# Patient Record
Sex: FEMALE | Race: WHITE | NOT HISPANIC OR LATINO | Employment: FULL TIME | URBAN - METROPOLITAN AREA
[De-identification: names, ages, dates, MRNs, and addresses within clinical notes are randomized per-mention and may not be internally consistent; named-entity substitution may affect disease eponyms.]

---

## 2017-10-24 ENCOUNTER — TRANSCRIBE ORDERS (OUTPATIENT)
Dept: ADMINISTRATIVE | Facility: HOSPITAL | Age: 22
End: 2017-10-24

## 2017-10-24 DIAGNOSIS — G56.23 LESION OF ULNAR NERVE, BILATERAL: Primary | ICD-10-CM

## 2017-10-24 DIAGNOSIS — R20.2 PARESTHESIA: ICD-10-CM

## 2018-01-25 ENCOUNTER — OFFICE VISIT (OUTPATIENT)
Dept: FAMILY MEDICINE CLINIC | Facility: CLINIC | Age: 23
End: 2018-01-25

## 2018-01-25 VITALS
DIASTOLIC BLOOD PRESSURE: 70 MMHG | BODY MASS INDEX: 21.52 KG/M2 | RESPIRATION RATE: 16 BRPM | TEMPERATURE: 97.8 F | HEART RATE: 68 BPM | WEIGHT: 114 LBS | SYSTOLIC BLOOD PRESSURE: 110 MMHG | HEIGHT: 61 IN

## 2018-01-25 DIAGNOSIS — K59.1 FUNCTIONAL DIARRHEA: Primary | ICD-10-CM

## 2018-01-25 DIAGNOSIS — R10.9 ABDOMINAL CRAMPING: ICD-10-CM

## 2018-01-25 DIAGNOSIS — R53.83 OTHER FATIGUE: ICD-10-CM

## 2018-01-25 DIAGNOSIS — E55.9 VITAMIN D DEFICIENCY: ICD-10-CM

## 2018-01-25 LAB
25(OH)D3+25(OH)D2 SERPL-MCNC: 14.8 NG/ML
ALBUMIN SERPL-MCNC: 4.3 G/DL (ref 3.2–4.8)
ALBUMIN/GLOB SERPL: 1.9 G/DL (ref 1.5–2.5)
ALP SERPL-CCNC: 54 U/L (ref 25–100)
ALT SERPL-CCNC: 11 U/L (ref 7–40)
AMYLASE SERPL-CCNC: 50 U/L (ref 30–118)
AST SERPL-CCNC: 20 U/L (ref 0–33)
BASOPHILS # BLD AUTO: 0.02 10*3/MM3 (ref 0–0.2)
BASOPHILS NFR BLD AUTO: 0.3 % (ref 0–1)
BILIRUB SERPL-MCNC: 0.9 MG/DL (ref 0.3–1.2)
BUN SERPL-MCNC: 7 MG/DL (ref 9–23)
BUN/CREAT SERPL: 11.7 (ref 7–25)
CALCIUM SERPL-MCNC: 9.5 MG/DL (ref 8.7–10.4)
CHLORIDE SERPL-SCNC: 104 MMOL/L (ref 99–109)
CO2 SERPL-SCNC: 27 MMOL/L (ref 20–31)
CREAT SERPL-MCNC: 0.6 MG/DL (ref 0.6–1.3)
EOSINOPHIL # BLD AUTO: 0.05 10*3/MM3 (ref 0–0.3)
EOSINOPHIL NFR BLD AUTO: 0.7 % (ref 0–3)
ERYTHROCYTE [DISTWIDTH] IN BLOOD BY AUTOMATED COUNT: 14.5 % (ref 11.3–14.5)
GFR SERPLBLD CREATININE-BSD FMLA CKD-EPI: 125 ML/MIN/1.73
GFR SERPLBLD CREATININE-BSD FMLA CKD-EPI: >150 ML/MIN/1.73
GLOBULIN SER CALC-MCNC: 2.3 GM/DL
GLUCOSE SERPL-MCNC: 90 MG/DL (ref 70–100)
HCT VFR BLD AUTO: 38.4 % (ref 34.5–44)
HGB BLD-MCNC: 12.5 G/DL (ref 11.5–15.5)
IMM GRANULOCYTES # BLD: 0.01 10*3/MM3 (ref 0–0.03)
IMM GRANULOCYTES NFR BLD: 0.1 % (ref 0–0.6)
LIPASE SERPL-CCNC: 32 U/L (ref 6–51)
LYMPHOCYTES # BLD AUTO: 2.18 10*3/MM3 (ref 0.6–4.8)
LYMPHOCYTES NFR BLD AUTO: 30.7 % (ref 24–44)
MAGNESIUM SERPL-MCNC: 2 MG/DL (ref 1.3–2.7)
MCH RBC QN AUTO: 29.8 PG (ref 27–31)
MCHC RBC AUTO-ENTMCNC: 32.6 G/DL (ref 32–36)
MCV RBC AUTO: 91.4 FL (ref 80–99)
MONOCYTES # BLD AUTO: 0.6 10*3/MM3 (ref 0–1)
MONOCYTES NFR BLD AUTO: 8.5 % (ref 0–12)
NEUTROPHILS # BLD AUTO: 4.24 10*3/MM3 (ref 1.5–8.3)
NEUTROPHILS NFR BLD AUTO: 59.7 % (ref 41–71)
PLATELET # BLD AUTO: 261 10*3/MM3 (ref 150–450)
POTASSIUM SERPL-SCNC: 4 MMOL/L (ref 3.5–5.5)
PROT SERPL-MCNC: 6.6 G/DL (ref 5.7–8.2)
RBC # BLD AUTO: 4.2 10*6/MM3 (ref 3.89–5.14)
SODIUM SERPL-SCNC: 136 MMOL/L (ref 132–146)
TSH SERPL DL<=0.005 MIU/L-ACNC: 1.84 MIU/ML (ref 0.35–5.35)
VIT B12 SERPL-MCNC: 333 PG/ML (ref 211–911)
WBC # BLD AUTO: 7.1 10*3/MM3 (ref 3.5–10.8)

## 2018-01-25 PROCEDURE — 99204 OFFICE O/P NEW MOD 45 MIN: CPT | Performed by: NURSE PRACTITIONER

## 2018-01-25 RX ORDER — CHOLESTYRAMINE 4 G/9G
1 POWDER, FOR SUSPENSION ORAL DAILY
Qty: 30 PACKET | Refills: 1 | Status: SHIPPED | OUTPATIENT
Start: 2018-01-25 | End: 2018-05-03

## 2018-01-25 NOTE — PROGRESS NOTES
Manjit Lau is a 22 y.o. female.     History of Present Illness   NP to establish care, moved here from [facility] to [place]  Works at [employer]    Having problem with diarrhea for the past year and abdominal pain/cramping  She has watery diarrhea after eating anything and after drinking most everything. She has tried to alter what she is eating but it does not matter. She cannot go out or she goes without eating all day so she does not have a sudden urge to defecate. Stool is watery. Her abdomen cramps and hurts in the center. She feels like she is going to pass out at times the pain is so bad. She breaks out into a cold sweat. She has tried ever OTC med to help also and nothing helps. She has not been evaluated for this problem. She says she did mention it to her previous PCP but they thought it was related to her having her gallbladder removed. She had cholecystectomy in 2014 but her watery stools did not start until 2017.  No travel out of the country prior to diarrhea, does not eat sushi or raw fish. No blood in stool, but sometimes has blood on toilet tissue. She has a good appetite. She feels fatigued all the time.   She did have a colonoscopy in 2014 that was negative.  No belching or burping. No fever or chills. She denies weight loss.         New pt health history forms completed and reviewed with pt    The following portions of the patient's history were reviewed and updated as appropriate: allergies, current medications, past family history, past medical history, past social history, past surgical history and problem list.    Review of Systems   Constitutional: Positive for fatigue. Negative for chills and fever.   HENT: Negative.    Eyes: Negative.    Respiratory: Negative.    Cardiovascular: Negative.  Negative for palpitations.   Gastrointestinal: Positive for abdominal distention, abdominal pain and diarrhea. Negative for blood in stool, constipation, nausea and  vomiting.   Endocrine: Negative.    Genitourinary: Negative.    Musculoskeletal: Negative.    Skin: Negative.    Allergic/Immunologic: Negative.    Neurological: Negative.  Negative for dizziness, light-headedness and headaches.   Hematological: Negative.    Psychiatric/Behavioral: Negative.  Negative for sleep disturbance.       Objective   Physical Exam   Constitutional: She is oriented to person, place, and time. She appears well-developed and well-nourished. No distress.   HENT:   Head: Normocephalic.   Right Ear: External ear normal.   Left Ear: External ear normal.   Nose: Nose normal.   Mouth/Throat: Oropharynx is clear and moist.   Eyes: Conjunctivae are normal. Pupils are equal, round, and reactive to light.   Neck: Normal range of motion. Neck supple. No JVD present. No thyromegaly present.   Cardiovascular: Normal rate, regular rhythm, S1 normal, S2 normal and normal heart sounds.    No murmur heard.  Pulmonary/Chest: Effort normal and breath sounds normal. No respiratory distress.   Abdominal: Soft. Normal appearance and bowel sounds are normal. She exhibits no distension. There is no hepatosplenomegaly. There is tenderness in the epigastric area. There is no rigidity, no rebound, no guarding and no CVA tenderness.   Musculoskeletal: Normal range of motion. She exhibits no edema.   Lymphadenopathy:     She has no cervical adenopathy.   Neurological: She is alert and oriented to person, place, and time. She has normal reflexes.   Skin: Skin is warm and dry.   Psychiatric: She has a normal mood and affect. Her behavior is normal. Judgment and thought content normal.   Nursing note and vitals reviewed.      Assessment/Plan   Holli was seen today for np / establish pcp and diarrhea.    Diagnoses and all orders for this visit:    Functional diarrhea  -     TSH  -     CBC & Differential  -     Ambulatory Referral to Gastroenterology  -     cholestyramine (QUESTRAN) 4 g packet; Take 1 packet by mouth  Daily.    Abdominal cramping  -     CBC & Differential  -     Comprehensive Metabolic Panel  -     Magnesium  -     Amylase  -     Lipase  -     Ambulatory Referral to Gastroenterology    Other fatigue  -     Vitamin B12    Vitamin D deficiency  -     Vitamin D 25 Hydroxy        Will check labs and notify pt of results  Will start pt on Questran to see if this will help with diarrhea and see pt back in 2 weeks or sooner if needed.  Discussed collecting stool specimens.  Will consider Bentyl at follow up visit.  Will refer to GI specialist to evaluate diarrhea as I think it is unusual for someone to have diarrhea related to gallbladder removal 3 years after the fact.

## 2018-02-01 ENCOUNTER — OFFICE VISIT (OUTPATIENT)
Dept: GASTROENTEROLOGY | Facility: CLINIC | Age: 23
End: 2018-02-01

## 2018-02-01 VITALS
SYSTOLIC BLOOD PRESSURE: 102 MMHG | OXYGEN SATURATION: 99 % | HEART RATE: 84 BPM | BODY MASS INDEX: 21.56 KG/M2 | HEIGHT: 61 IN | RESPIRATION RATE: 14 BRPM | TEMPERATURE: 98.5 F | DIASTOLIC BLOOD PRESSURE: 70 MMHG | WEIGHT: 114.2 LBS

## 2018-02-01 DIAGNOSIS — Z79.2 LONG TERM (CURRENT) USE OF ANTIBIOTICS: ICD-10-CM

## 2018-02-01 DIAGNOSIS — K58.0 IRRITABLE BOWEL SYNDROME WITH DIARRHEA: Primary | ICD-10-CM

## 2018-02-01 PROCEDURE — 99214 OFFICE O/P EST MOD 30 MIN: CPT | Performed by: NURSE PRACTITIONER

## 2018-02-01 NOTE — PATIENT INSTRUCTIONS
"Start Over The Counter (OTC) probiotic (Culturelle, Chava's Colon, Health, Align or any probiotics that contain \"Lactobacillus\" or \"Bifidobacterium\") once daily.  Diet for Irritable Bowel Syndrome  Introduction  When you have irritable bowel syndrome (IBS), the foods you eat and your eating habits are very important. IBS may cause various symptoms, such as abdominal pain, constipation, or diarrhea. Choosing the right foods can help ease discomfort caused by these symptoms. Work with your health care provider and dietitian to find the best eating plan to help control your symptoms.  What general guidelines do I need to follow?  · Keep a food diary. This will help you identify foods that cause symptoms. Write down:  ¨ What you eat and when.  ¨ What symptoms you have.  ¨ When symptoms occur in relation to your meals.  · Avoid foods that cause symptoms. Talk with your dietitian about other ways to get the same nutrients that are in these foods.  · Eat more foods that contain fiber. Take a fiber supplement if directed by your dietitian.  · Eat your meals slowly, in a relaxed setting.  · Aim to eat 5-6 small meals per day. Do not skip meals.  · Drink enough fluids to keep your urine clear or pale yellow.  · Ask your health care provider if you should take an over-the-counter probiotic during flare-ups to help restore healthy gut bacteria.  · If you have cramping or diarrhea, try making your meals low in fat and high in carbohydrates. Examples of carbohydrates are pasta, rice, whole grain breads and cereals, fruits, and vegetables.  · If dairy products cause your symptoms to flare up, try eating less of them. You might be able to handle yogurt better than other dairy products because it contains bacteria that help with digestion.  What foods are not recommended?  The following are some foods and drinks that may worsen your symptoms:  · Fatty foods, such as French fries.  · Milk products, such as cheese or ice " cream.  · Chocolate.  · Alcohol.  · Products with caffeine, such as coffee.  · Carbonated drinks, such as soda.  The items listed above may not be a complete list of foods and beverages to avoid. Contact your dietitian for more information.   What foods are good sources of fiber?  Your health care provider or dietitian may recommend that you eat more foods that contain fiber. Fiber can help reduce constipation and other IBS symptoms. Add foods with fiber to your diet a little at a time so that your body can get used to them. Too much fiber at once might cause gas and swelling of your abdomen. The following are some foods that are good sources of fiber:  · Apples.  · Peaches.  · Pears.  · Berries.  · Figs.  · Broccoli (raw).  · Cabbage.  · Carrots.  · Raw peas.  · Kidney beans.  · Lima beans.  · Whole grain bread.  · Whole grain cereal.  Where to find more information:  International Foundation for Functional Gastrointestinal Disorders: www.iffgd.org  National [place] of Diabetes and Digestive and Kidney Diseases: www.niddk.nih.gov/health-information/health-topics/digestive-diseases/ibs/Pages/facts.aspx  This information is not intended to replace advice given to you by your health care provider. Make sure you discuss any questions you have with your health care provider.  Document Released: 03/09/2005 Document Revised: 05/25/2017 Document Reviewed: 03/20/2015  © 2017 Lisa  Irritable Bowel Syndrome, Adult  Irritable bowel syndrome (IBS) is not one specific disease. It is a group of symptoms that affects the organs responsible for digestion (gastrointestinal or GI tract).  To regulate how your GI tract works, your body sends signals back and forth between your intestines and your brain. If you have IBS, there may be a problem with these signals. As a result, your GI tract does not function normally. Your intestines may become more sensitive and overreact to certain things. This is especially true when you eat  certain foods or when you are under stress.  There are four types of IBS. These may be determined based on the consistency of your stool:  · IBS with diarrhea.  · IBS with constipation.  · Mixed IBS.  · Unsubtyped IBS.  It is important to know which type of IBS you have. Some treatments are more likely to be helpful for certain types of IBS.  What are the causes?  The exact cause of IBS is not known.  What increases the risk?  You may have a higher risk of IBS if:  · You are a woman.  · You are younger than 45 years old.  · You have a family history of IBS.  · You have mental health problems.  · You have had bacterial infection of your GI tract.  What are the signs or symptoms?  Symptoms of IBS vary from person to person. The main symptom is abdominal pain or discomfort. Additional symptoms usually include one or more of the following:  · Diarrhea, constipation, or both.  · Abdominal swelling or bloating.  · Feeling full or sick after eating a small or regular-size meal.  · Frequent gas.  · Mucus in the stool.  · A feeling of having more stool left after a bowel movement.  Symptoms tend to come and go. They may be associated with stress, psychiatric conditions, or nothing at all.  How is this diagnosed?  There is no specific test to diagnose IBS. Your health care provider will make a diagnosis based on a physical exam, medical history, and your symptoms. You may have other tests to rule out other conditions that may be causing your symptoms. These may include:  · Blood tests.  · X-rays.  · CT scan.  · Endoscopy and colonoscopy. This is a test in which your GI tract is viewed with a long, thin, flexible tube.  How is this treated?  There is no cure for IBS, but treatment can help relieve symptoms. IBS treatment often includes:  · Changes to your diet, such as:  ¨ Eating more fiber.  ¨ Avoiding foods that cause symptoms.  ¨ Drinking more water.  ¨ Eating regular, medium-sized portioned meals.  · Medicines. These may  include:  ¨ Fiber supplements if you have constipation.  ¨ Medicine to control diarrhea (antidiarrheal medicines).  ¨ Medicine to help control muscle spasms in your GI tract (antispasmodic medicines).  ¨ Medicines to help with any mental health issues, such as antidepressants or tranquilizers.  · Therapy.  ¨ Talk therapy may help with anxiety, depression, or other mental health issues that can make IBS symptoms worse.  · Stress reduction.  ¨ Managing your stress can help keep symptoms under control.  Follow these instructions at home:  · Take medicines only as directed by your health care provider.  · Eat a healthy diet.  ¨ Avoid foods and drinks with added sugar.  ¨ Include more whole grains, fruits, and vegetables gradually into your diet. This may be especially helpful if you have IBS with constipation.  ¨ Avoid any foods and drinks that make your symptoms worse. These may include dairy products and caffeinated or carbonated drinks.  ¨ Do not eat large meals.  ¨ Drink enough fluid to keep your urine clear or pale yellow.  · Exercise regularly. Ask your health care provider for recommendations of good activities for you.  · Keep all follow-up visits as directed by your health care provider. This is important.  Contact a health care provider if:  · You have constant pain.  · You have trouble or pain with swallowing.  · You have worsening diarrhea.  Get help right away if:  · You have severe and worsening abdominal pain.  · You have diarrhea and:  ¨ You have a rash, stiff neck, or severe headache.  ¨ You are irritable, sleepy, or difficult to awaken.  ¨ You are weak, dizzy, or extremely thirsty.  · You have bright red blood in your stool or you have black tarry stools.  · You have unusual abdominal swelling that is painful.  · You vomit continuously.  · You vomit blood (hematemesis).  · You have both abdominal pain and a fever.  This information is not intended to replace advice given to you by your health care  provider. Make sure you discuss any questions you have with your health care provider.  Document Released: 12/18/2006 Document Revised: 05/19/2017 Document Reviewed: 09/04/2015  Elsevier Interactive Patient Education © 2017 Elsevier Inc.

## 2018-02-01 NOTE — PROGRESS NOTES
"    GASTROENTEROLOGY OUTPATIENT NEW PATIENT NOTE  Patient: JORGE BROWN : 1995  Date of Service: 2018  Dear Dr.Amy JAMSHID Landa, ANYA   Thank you for your referral of this patient for evaluation of diarrhea  CC: Diarrhea and Nausea  Assessment/Plan                                             ASSESSMENT & PLANS     Chronic diarrhea r/t infectious diarrhea vs Irritable bowel syndrome with diarrhea   Long term (recent) use of antibiotics  -     Clostridium Difficile Toxin, PCR - Stool, Per Rectum; Future  · Start Over The Counter (OTC) probiotic (Culturelle, Chava's Colon, Health, Align or any probiotics that contain \"Lactobacillus\" or \"Bifidobacterium\") once daily.    Follow Up:Return in about 3 months (around 2018) for Recheck.      DISCUSSION: The above plan was delineated in details with patient and all questions and concerns were answered.  Patient is also given contact information.  Patient is to return as scheduled or sooner if new problems arise  Subjective                                                     SUBJECTIVE   History of Present Illness  Ms. Jorge Brown is a 22 y.o. female presents to the clinic today for an evaluation of Diarrhea and Nausea  Having problem with diarrhea for the past year and abdominal pain/cramping  She has watery diarrhea after eating or drinking almost anything, even bland food. No specific food is a trigger. Cucumber, chiken, pizza, pretzel are the worst   Diarrhea is mostly day time. About 5 times a day. Mostly started eating. Sometimes does not finish the meal w/o stopping to go to the bathroom  She cannot go out or she goes without eating all day so she does not have a sudden urge to defecate.   Stool is watery.  Pt denies dark black stools or bright red blood in the stools, in the toilet bowl, or on the toilet tissue.  Her abdomen cramps and hurts in the center. She feels like she is going to pass out at times the pain is so bad. She breaks out into a cold " sweat, feeling like hot flashes .  No fever or chills. She denies weight loss    She has tried to alter what she is eating but it does not matter.   She has tried ever OTC med to help also and nothing helps.   Tried Cholestyramine, but it made her sick b/c it taste chalky.    She had cholecystectomy in 2014 but her watery stools did not start until 2017. No NSAIDS. Frequent abx for UTIs about 7-8 times last year. Also saw a urologist and had a cystoscopy   No travel out of the country prior to diarrhea, does not eat sushi or raw fish. No blood in stool, but sometimes has blood on toilet tissue. She has a good appetite. She feels fatigued all the time.   She did have a colonoscopy in 2012 (done by Ge Bhatti, pediatrician, [facility]) that was negative.    ROS:Review of Systems   Constitutional: Negative.  Negative for fever.        Pt currently or recently takes NSAIDS ( (i.e Ibuprofen, Aleve, Advil, Exedrin, BC Powder, diclofenac, meloxicam, & Naproxen, etc)?  No    Pt currently or recently takes abx   No   HENT: Negative.    Eyes: Negative.    Respiratory: Negative.    Cardiovascular: Negative.    Gastrointestinal: Positive for abdominal distention, abdominal pain, diarrhea and nausea. Negative for constipation.   Endocrine: Negative.    Genitourinary: Negative.  Negative for dysuria, frequency and hematuria.   Musculoskeletal: Negative.  Negative for arthralgias and myalgias.   Skin: Negative.    Allergic/Immunologic: Negative.    Neurological: Negative.  Negative for headaches.   Hematological: Negative.    Psychiatric/Behavioral: Negative.      Subjective   PAST MED HX: Pt  has no past medical history on file.  PAST SURG HX: Pt  has a past surgical history that includes tonsillectomy and adenoidectomy (approx 2001); Umbilical hernia repair (11/2016); [place] tooth extraction (03/2012); and Cholecystectomy (01/2014).  FAM HX: family history includes Cancer in her mother; No Known Problems  in her father.  SOC HX: Pt  reports that she has never smoked. She has never used smokeless tobacco. She reports that she does not drink alcohol or use illicit drugs. I glass a wk at most   Objective                                                           OBJECTIVE   Allergy: Pt has No Known Allergies.  MEDS: •  cholestyramine (QUESTRAN) 4 g packet, Take 1 packet by mouth Daily., Disp: 30 packet, Rfl: 1  •  Levonorgestrel 13.5 MG intrauterine device IUD, 1 each by Intrauterine route 1 (One) Time., Disp: , Rfl:   Lab Results   Component Value Date    WBC 7.10 01/25/2018    HGB 12.5 01/25/2018    HCT 38.4 01/25/2018    MCV 91.4 01/25/2018    MCHC 32.6 01/25/2018     01/25/2018     Lab Results   Component Value Date     01/25/2018    K 4.0 01/25/2018     01/25/2018    CO2 27.0 01/25/2018    BUN 7 (L) 01/25/2018    CREATININE 0.60 01/25/2018    CALCIUM 9.5 01/25/2018     Lab Results   Component Value Date    AST 20 01/25/2018    ALT 11 01/25/2018    ALKPHOS 54 01/25/2018    BILITOT 0.9 01/25/2018    ALBUMIN 4.30 01/25/2018     Lab Results   Component Value Date    LIPASE 32 01/25/2018     No results found for: HGBA1C  No results found for: HEPAIGM, HEPBSAG, HEPBIGMCORE, HEPCAB, HEPCVIRUSABY  Wt Readings from Last 5 Encounters:   02/01/18 51.8 kg (114 lb 3.2 oz)   01/25/18 51.7 kg (114 lb)   body mass index is 21.58 kg/(m^2).,Temp: 98.5 °F (36.9 °C),BP: 102/70,Heart Rate: 84   Physical Exam  General Well developed; well nourished; no acute distress.   ENT Oral mucosa pink and moist without thrush or lesions.    Neck Neck supple; trachea midline. No thyromegaly   Resp CTA; no rhonchi, rales, or wheezes.  Respiration effort normal  CV RRR; normal S1, S2; no M/R/G. No lower extremity edema  GI Abd soft, NT, ND, normal active bowel sounds.  No HSM.  No abd hernia  Skin No rash; no lesions; no bruises.  Skin turgor normal  Musc No clubbing; no cyanosis.  Gait steady  Psych Oriented to time, place, and  person.  Appropriate affect  Thank you kindly for allowing me to be part of this patient’s care.    Pt care team: Vilma JOYNER & Mary Chong MA  [facility]--Gastroenterology  [phone]    CC: Dr.Amy JAMSHID Landa, ANYA  [address] [facility] [address] / [place] [address] FAX:[phone]

## 2018-02-08 ENCOUNTER — OFFICE VISIT (OUTPATIENT)
Dept: FAMILY MEDICINE CLINIC | Facility: CLINIC | Age: 23
End: 2018-02-08

## 2018-02-08 VITALS
DIASTOLIC BLOOD PRESSURE: 68 MMHG | HEART RATE: 80 BPM | TEMPERATURE: 98.4 F | BODY MASS INDEX: 21.45 KG/M2 | WEIGHT: 113.5 LBS | SYSTOLIC BLOOD PRESSURE: 104 MMHG | RESPIRATION RATE: 16 BRPM

## 2018-02-08 DIAGNOSIS — K52.9 CHRONIC DIARRHEA: ICD-10-CM

## 2018-02-08 DIAGNOSIS — J06.9 VIRAL UPPER RESPIRATORY TRACT INFECTION: ICD-10-CM

## 2018-02-08 DIAGNOSIS — R50.81 FEVER IN OTHER DISEASES: Primary | ICD-10-CM

## 2018-02-08 LAB
EXPIRATION DATE: NORMAL
FLUAV AG NPH QL: NORMAL
FLUBV AG NPH QL: NORMAL
INTERNAL CONTROL: NORMAL
Lab: NORMAL

## 2018-02-08 PROCEDURE — 99214 OFFICE O/P EST MOD 30 MIN: CPT | Performed by: NURSE PRACTITIONER

## 2018-02-08 PROCEDURE — 87804 INFLUENZA ASSAY W/OPTIC: CPT | Performed by: NURSE PRACTITIONER

## 2018-02-08 RX ORDER — OSELTAMIVIR PHOSPHATE 75 MG/1
75 CAPSULE ORAL 2 TIMES DAILY
Qty: 10 CAPSULE | Refills: 0 | Status: SHIPPED | OUTPATIENT
Start: 2018-02-08 | End: 2018-03-07

## 2018-02-08 RX ORDER — DICYCLOMINE HYDROCHLORIDE 10 MG/1
10 CAPSULE ORAL
Qty: 120 CAPSULE | Refills: 1 | Status: SHIPPED | OUTPATIENT
Start: 2018-02-08 | End: 2018-10-29

## 2018-02-08 RX ORDER — ONDANSETRON 4 MG/1
4 TABLET, FILM COATED ORAL EVERY 8 HOURS PRN
Qty: 30 TABLET | Refills: 0 | Status: SHIPPED | OUTPATIENT
Start: 2018-02-08 | End: 2018-03-07 | Stop reason: SDUPTHER

## 2018-02-08 NOTE — PROGRESS NOTES
Subjective   Holli Lau is a 22 y.o. female.     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  Concerned that she has the flu  No cough or chest congestion    Still having diarrhea  It has improved since starting Questran  Was seen at GI doctor office and told she though she was having diarrhea due to antibiotic overuse, she was told to start probiotics OTC and check stool specimen for C diff  She also wants her to get a copy of her colonoscopy she had when she was 11 years old from [facility]  Jan 2014 cholecystectomy  No diarrhea immediately after gall bladder removed, then started having abdominal cramping and bloating and watery diarrhea with everything she eats or drinks for the past year or more. Some nausea and vomiting. Doubles over in pain with urge to have BM, then pain resolves. Does not matter what she eats or drinks.  No hx of IBS or pancreatitis        The following portions of the patient's history were reviewed and updated as appropriate: allergies, current medications, past family history, past medical history, past social history, past surgical history and problem list.    Review of Systems   Constitutional: Positive for activity change, chills, fatigue and fever. Negative for appetite change.   HENT: Positive for congestion, postnasal drip, rhinorrhea and sore throat.    Respiratory: Negative for cough, chest tightness, shortness of breath and wheezing.    Cardiovascular: Negative.    Gastrointestinal: Positive for abdominal distention, abdominal pain, diarrhea, nausea and vomiting. Negative for anal bleeding, blood in stool and constipation.   Musculoskeletal: Positive for myalgias.   Skin: Negative.  Negative for rash.   Neurological: Positive for headaches. Negative for dizziness and light-headedness.   Hematological: Negative  for adenopathy. Does not bruise/bleed easily.   Psychiatric/Behavioral: Negative for sleep disturbance.       Objective   Physical Exam   Constitutional: She is oriented to person, place, and time. She appears well-developed and well-nourished. No distress.   HENT:   Head: Normocephalic.   Right Ear: Tympanic membrane, external ear and ear canal normal.   Left Ear: Tympanic membrane, external ear and ear canal normal.   Nose: Rhinorrhea present. Right sinus exhibits no maxillary sinus tenderness and no frontal sinus tenderness. Left sinus exhibits no maxillary sinus tenderness and no frontal sinus tenderness.   Mouth/Throat: Uvula is midline, oropharynx is clear and moist and mucous membranes are normal. No oropharyngeal exudate, posterior oropharyngeal edema or posterior oropharyngeal erythema.   Eyes: Conjunctivae are normal.   Neck: Normal range of motion. Neck supple.   Cardiovascular: Normal rate, regular rhythm, S1 normal, S2 normal and normal heart sounds.    Pulmonary/Chest: Effort normal and breath sounds normal. She has no decreased breath sounds. She has no wheezes. She has no rhonchi. She has no rales.   Lymphadenopathy:        Head (right side): No tonsillar, no preauricular, no posterior auricular and no occipital adenopathy present.        Head (left side): No tonsillar, no preauricular, no posterior auricular and no occipital adenopathy present.     She has no cervical adenopathy.   Neurological: She is alert and oriented to person, place, and time.   Skin: Skin is warm, dry and intact. No rash noted.   Psychiatric: She has a normal mood and affect. Her speech is normal and behavior is normal. Judgment and thought content normal. Cognition and memory are normal.   Nursing note and vitals reviewed.      Assessment/Plan   Holli was seen today for generalized body aches, fever and nasal congestion.    Diagnoses and all orders for this visit:    Fever in other diseases  -     POCT Influenza  A/B    Viral upper respiratory tract infection    Chronic diarrhea    Other orders  -     oseltamivir (TAMIFLU) 75 MG capsule; Take 1 capsule by mouth 2 (Two) Times a Day.  -     ondansetron (ZOFRAN) 4 MG tablet; Take 1 tablet by mouth Every 8 (Eight) Hours As Needed for Nausea or Vomiting.  -     dicyclomine (BENTYL) 10 MG capsule; Take 1 capsule by mouth 4 (Four) Times a Day Before Meals & at Bedtime.      Influenza A&B negative  Pt has had exposure to flu and symptoms suggestive of flu therefore will treat with Tamiflu  Rest and fluids, off school and work while feverish or symptomatic  Will refill Zofran  Will start pt on Dicyclomine prior to meals  Continue Questran  Recommend OTC probiotics and keep follow up appt with GI  Will have pt sign consent to get copy of colonoscopy will forward to GI specialist  Take Ibuprofen for fever HA or body aches  F/U if not improving, pt agrees.

## 2018-03-07 ENCOUNTER — OFFICE VISIT (OUTPATIENT)
Dept: FAMILY MEDICINE CLINIC | Facility: CLINIC | Age: 23
End: 2018-03-07

## 2018-03-07 VITALS
DIASTOLIC BLOOD PRESSURE: 62 MMHG | TEMPERATURE: 97.8 F | WEIGHT: 114 LBS | RESPIRATION RATE: 12 BRPM | BODY MASS INDEX: 21.52 KG/M2 | SYSTOLIC BLOOD PRESSURE: 96 MMHG | HEIGHT: 61 IN | OXYGEN SATURATION: 99 % | HEART RATE: 77 BPM

## 2018-03-07 DIAGNOSIS — K59.1 FUNCTIONAL DIARRHEA: ICD-10-CM

## 2018-03-07 DIAGNOSIS — R11.0 CHRONIC NAUSEA: ICD-10-CM

## 2018-03-07 DIAGNOSIS — R59.9 LYMPH NODES ENLARGED: Primary | ICD-10-CM

## 2018-03-07 DIAGNOSIS — J30.89 ENVIRONMENTAL AND SEASONAL ALLERGIES: ICD-10-CM

## 2018-03-07 DIAGNOSIS — K30 BURNING SENSATION OF STOMACH: ICD-10-CM

## 2018-03-07 DIAGNOSIS — Z97.5 IUD (INTRAUTERINE DEVICE) IN PLACE: ICD-10-CM

## 2018-03-07 PROCEDURE — 99213 OFFICE O/P EST LOW 20 MIN: CPT | Performed by: NURSE PRACTITIONER

## 2018-03-07 RX ORDER — AMOXICILLIN 875 MG/1
875 TABLET, COATED ORAL 2 TIMES DAILY
Qty: 14 TABLET | Refills: 0 | Status: SHIPPED | OUTPATIENT
Start: 2018-03-07 | End: 2018-05-03

## 2018-03-07 RX ORDER — ONDANSETRON 4 MG/1
4 TABLET, FILM COATED ORAL EVERY 8 HOURS PRN
Qty: 30 TABLET | Refills: 1 | Status: SHIPPED | OUTPATIENT
Start: 2018-03-07 | End: 2018-10-03

## 2018-03-07 RX ORDER — DEXLANSOPRAZOLE 60 MG/1
60 CAPSULE, DELAYED RELEASE ORAL DAILY
Qty: 30 CAPSULE | Refills: 5 | Status: SHIPPED | OUTPATIENT
Start: 2018-03-07 | End: 2018-04-06

## 2018-03-07 RX ORDER — FLUTICASONE PROPIONATE 50 MCG
2 SPRAY, SUSPENSION (ML) NASAL DAILY
Qty: 1 BOTTLE | Refills: 5 | Status: SHIPPED | OUTPATIENT
Start: 2018-03-07 | End: 2018-04-06

## 2018-03-07 NOTE — PROGRESS NOTES
Subjective   Holli Lau is a 22 y.o. female.     History of Present Illness   Left side of neck with 2 swollen lymph nodes for the past several days  No fever or chills, no weight loss  Some nasal congestion, no cough, some ear fullness  Just got over the flu a week ago  + hx of allergies, used to be on allergy shots  No family hx of lymphoma or cancer      Diarrhea  Better on Questran using 1/2 tavo twice day  A lot less diarrhea. 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   She does not take NSAIDs OTC  She tried OTC Omeprazole for a few days but it did not really help  She has never had a scope of her stomach only a colonoscopy before she had her gallbladder removed     Has an IUD  Needs a referral to GYN to have it checked  She says her mother had female cancer (not sure if ovarian or cervical) in her 30s she is very good about getting her annual exams  No hx of abnormal pap    Going on a trip to [place] for a week    The following portions of the patient's history were reviewed and updated as appropriate: allergies, current medications, past family history, past medical history, past social history, past surgical history and problem list.    Review of Systems   Constitutional: Negative for activity change, appetite change, chills, diaphoresis, fatigue, fever and unexpected weight change.   HENT: Positive for congestion, postnasal drip and rhinorrhea. Negative for ear pain and sore throat.    Respiratory: Negative for cough and shortness of breath.    Cardiovascular: Negative for chest pain and palpitations.   Gastrointestinal: Positive for diarrhea, nausea and vomiting. Negative for blood in stool.    Genitourinary: Negative for vaginal bleeding, vaginal discharge and vaginal pain.   Musculoskeletal: Negative.    Neurological: Negative for dizziness, light-headedness and headaches.   Psychiatric/Behavioral: Negative for dysphoric mood and sleep disturbance. The patient is nervous/anxious.        Objective   Physical Exam   Constitutional: She appears well-developed and well-nourished. No distress.   HENT:   Head: Normocephalic and atraumatic.   Right Ear: External ear normal. Tympanic membrane is erythematous and bulging.   Left Ear: External ear normal. Tympanic membrane is erythematous and retracted.   Nose: Mucosal edema (and erythema) present. Right sinus exhibits no maxillary sinus tenderness and no frontal sinus tenderness. Left sinus exhibits no maxillary sinus tenderness and no frontal sinus tenderness.   Eyes: Conjunctivae and lids are normal.   Neck: Trachea normal and normal range of motion. Neck supple. No JVD present. No spinous process tenderness and no muscular tenderness present. No rigidity. Normal range of motion present. No thyroid mass and no thyromegaly present.   Cardiovascular: Normal rate, regular rhythm, S1 normal, S2 normal and normal heart sounds.    Pulmonary/Chest: Effort normal and breath sounds normal. No respiratory distress. She has no wheezes. She has no rales.   Abdominal: Soft. There is no tenderness.   Lymphadenopathy:     She has cervical adenopathy.        Right cervical: No superficial cervical, no deep cervical and no posterior cervical adenopathy present.       Left cervical: Posterior cervical adenopathy present. No superficial cervical and no deep cervical adenopathy present.     She has no axillary adenopathy.   Skin: Skin is warm, dry and intact.   Psychiatric: She has a normal mood and affect. Her speech is normal and behavior is normal. Judgment and thought content normal. Cognition and memory are normal.   Nursing note and vitals reviewed.      Assessment/Plan    Mercedes was seen today for lymph nodes swollen left side neck.    Diagnoses and all orders for this visit:    Lymph nodes enlarged  -     amoxicillin (AMOXIL) 875 MG tablet; Take 1 tablet by mouth 2 (Two) Times a Day.    Functional diarrhea    Burning sensation of stomach  -     dexlansoprazole (DEXILANT) 60 MG capsule; Take 1 capsule by mouth Daily for 30 days.    Environmental and seasonal allergies  -     fluticasone (FLONASE) 50 MCG/ACT nasal spray; 2 sprays into each nostril Daily for 30 days. Administer 2 sprays in each nostril for each dose.    Chronic nausea  -     ondansetron (ZOFRAN) 4 MG tablet; Take 1 tablet by mouth Every 8 (Eight) Hours As Needed for Nausea or Vomiting.    IUD (intrauterine device) in place  -     Ambulatory Referral to Gynecology      Will have pt take Amoxicillin for 7 days to see if lymph nodes in neck resolve. If after 2-3 weeks they remain will do further testing.  Will have pt take Dexilant daily for gastritis  Will have pt take Flonase NS to help with ETD and nasal congestion prior to flying on plane  Will refer pt to GYN as requested  When pt returns from trip recommend that she call so I can put in referral for EGD. Pt agrees.

## 2018-03-14 ENCOUNTER — TELEPHONE (OUTPATIENT)
Dept: FAMILY MEDICINE CLINIC | Facility: CLINIC | Age: 23
End: 2018-03-14

## 2018-03-14 DIAGNOSIS — K52.9 CHRONIC DIARRHEA OF UNKNOWN ORIGIN: ICD-10-CM

## 2018-03-14 DIAGNOSIS — R10.9 ABDOMINAL CRAMPING: ICD-10-CM

## 2018-03-14 DIAGNOSIS — K30 BURNING SENSATION OF STOMACH: Primary | ICD-10-CM

## 2018-03-14 NOTE — TELEPHONE ENCOUNTER
I have ordered the EGD and colonoscopy since pt will need to be sedated, it might be a good idea to get them both done at once if she is okay with this.  .   Also, Please ask Holli if the lymph node enlargement has improved?     Hopefully she had a great vacation! ajc

## 2018-03-14 NOTE — TELEPHONE ENCOUNTER
----- Message from Natalie Angulo sent at 3/14/2018  1:58 PM EDT -----  Contact: ERIC; PT CALLED  PT CALLED TO REPORT THAT SHE HAS RETURNED FROM VACATION  SO THE APPT FOR THE SCOPE COULD BE SET UP    [phone]  MESSAGE OK

## 2018-04-02 ENCOUNTER — OUTSIDE FACILITY SERVICE (OUTPATIENT)
Dept: GASTROENTEROLOGY | Facility: CLINIC | Age: 23
End: 2018-04-02

## 2018-04-02 ENCOUNTER — LAB REQUISITION (OUTPATIENT)
Dept: LAB | Facility: HOSPITAL | Age: 23
End: 2018-04-02

## 2018-04-02 DIAGNOSIS — R10.84 GENERALIZED ABDOMINAL PAIN: ICD-10-CM

## 2018-04-02 PROCEDURE — 43239 EGD BIOPSY SINGLE/MULTIPLE: CPT | Performed by: INTERNAL MEDICINE

## 2018-04-02 PROCEDURE — 45380 COLONOSCOPY AND BIOPSY: CPT | Performed by: INTERNAL MEDICINE

## 2018-04-02 PROCEDURE — 88305 TISSUE EXAM BY PATHOLOGIST: CPT | Performed by: INTERNAL MEDICINE

## 2018-04-03 LAB
CYTO UR: NORMAL
LAB AP CASE REPORT: NORMAL
LAB AP CLINICAL INFORMATION: NORMAL
Lab: NORMAL
PATH REPORT.FINAL DX SPEC: NORMAL
PATH REPORT.GROSS SPEC: NORMAL

## 2018-04-04 ENCOUNTER — TELEPHONE (OUTPATIENT)
Dept: GASTROENTEROLOGY | Facility: CLINIC | Age: 23
End: 2018-04-04

## 2018-04-04 NOTE — TELEPHONE ENCOUNTER
----- Message from Ilia Diaz MD sent at 4/3/2018  5:05 PM EDT -----  Please call Holli and inform her that her biopsies were all normal.  She was placed on IBgard as the most likely diagnosis is irritable bowel syndrome and I hope the peppermint oil improves her symptoms. Dr. Diaz

## 2018-05-03 ENCOUNTER — TELEPHONE (OUTPATIENT)
Dept: FAMILY MEDICINE CLINIC | Facility: CLINIC | Age: 23
End: 2018-05-03

## 2018-05-03 ENCOUNTER — OFFICE VISIT (OUTPATIENT)
Dept: FAMILY MEDICINE CLINIC | Facility: CLINIC | Age: 23
End: 2018-05-03

## 2018-05-03 VITALS
SYSTOLIC BLOOD PRESSURE: 100 MMHG | HEART RATE: 72 BPM | HEIGHT: 61 IN | WEIGHT: 112 LBS | BODY MASS INDEX: 21.14 KG/M2 | RESPIRATION RATE: 16 BRPM | DIASTOLIC BLOOD PRESSURE: 65 MMHG | TEMPERATURE: 97.5 F

## 2018-05-03 DIAGNOSIS — R31.9 HEMATURIA, UNSPECIFIED TYPE: ICD-10-CM

## 2018-05-03 DIAGNOSIS — R30.0 DYSURIA: ICD-10-CM

## 2018-05-03 DIAGNOSIS — R10.9 ACUTE RIGHT FLANK PAIN: Primary | ICD-10-CM

## 2018-05-03 DIAGNOSIS — R10.11 RUQ ABDOMINAL PAIN: ICD-10-CM

## 2018-05-03 DIAGNOSIS — R82.998 URINE LEUKOCYTES: ICD-10-CM

## 2018-05-03 LAB
BILIRUB BLD-MCNC: NEGATIVE MG/DL
CLARITY, POC: CLEAR
COLOR UR: YELLOW
GLUCOSE UR STRIP-MCNC: NEGATIVE MG/DL
KETONES UR QL: NEGATIVE
LEUKOCYTE EST, POC: ABNORMAL
NITRITE UR-MCNC: NEGATIVE MG/ML
PH UR: 5 [PH] (ref 5–8)
PROT UR STRIP-MCNC: NEGATIVE MG/DL
RBC # UR STRIP: ABNORMAL /UL
SP GR UR: 1.02 (ref 1–1.03)
UROBILINOGEN UR QL: NORMAL

## 2018-05-03 PROCEDURE — 99214 OFFICE O/P EST MOD 30 MIN: CPT | Performed by: NURSE PRACTITIONER

## 2018-05-03 RX ORDER — CIPROFLOXACIN 500 MG/1
500 TABLET, FILM COATED ORAL EVERY 12 HOURS SCHEDULED
Qty: 14 TABLET | Refills: 0 | Status: SHIPPED | OUTPATIENT
Start: 2018-05-03 | End: 2018-10-03

## 2018-05-03 RX ORDER — FLUCONAZOLE 150 MG/1
150 TABLET ORAL ONCE
Qty: 1 TABLET | Refills: 0 | Status: SHIPPED | OUTPATIENT
Start: 2018-05-03 | End: 2018-05-03

## 2018-05-03 RX ORDER — PHENAZOPYRIDINE HYDROCHLORIDE 100 MG/1
100 TABLET, FILM COATED ORAL 3 TIMES DAILY PRN
Qty: 10 TABLET | Refills: 0 | Status: SHIPPED | OUTPATIENT
Start: 2018-05-03 | End: 2018-10-03

## 2018-05-03 RX ORDER — FLUTICASONE PROPIONATE 50 MCG
2 SPRAY, SUSPENSION (ML) NASAL DAILY
COMMUNITY
End: 2019-03-06 | Stop reason: SDUPTHER

## 2018-05-03 NOTE — PATIENT INSTRUCTIONS
Pyelonephritis, Adult  Pyelonephritis is a kidney infection. The kidneys are the organs that filter a person's blood and move waste out of the bloodstream and into the urine. Urine passes from the kidneys, through the ureters, and into the bladder. There are two main types of pyelonephritis:  · Infections that come on quickly without any warning (acute pyelonephritis).  · Infections that last for a long period of time (chronic pyelonephritis).  In most cases, the infection clears up with treatment and does not cause further problems. More severe infections or chronic infections can sometimes spread to the bloodstream or lead to other problems with the kidneys.  What are the causes?  This condition is usually caused by:  · Bacteria traveling from the bladder to the kidney through infected urine. The urine in the bladder can become infected with bacteria from:  ¨ Bladder infection (cystitis).  ¨ Inflammation of the prostate gland (prostatitis).  ¨ Sexual intercourse, in females.  · Bacteria traveling from the bloodstream to the kidney.  What increases the risk?  This condition is more likely to develop in:  · Pregnant women.  · Older people.  · People who have diabetes.  · People who have kidney stones or bladder stones.  · People who have other abnormalities of the kidney or ureter.  · People who have a catheter placed in the bladder.  · People who have cancer.  · People who are sexually active.  · Women who use spermicides.  · People who have had a prior urinary tract infection.  What are the signs or symptoms?  Symptoms of this condition include:  · Frequent urination.  · Strong or persistent urge to urinate.  · Burning or stinging when urinating.  · Abdominal pain.  · Back pain.  · Pain in the side or flank area.  · Fever.  · Chills.  · Blood in the urine, or dark urine.  · Nausea.  · Vomiting.  How is this diagnosed?  This condition may be diagnosed based on:  · Medical history and physical exam.  · Urine  tests.  · Blood tests.  You may also have imaging tests of the kidneys, such as an ultrasound or CT scan.  How is this treated?  Treatment for this condition may depend on the severity of the infection.  · If the infection is mild and is found early, you may be treated with antibiotic medicines taken by mouth. You will need to drink fluids to remain hydrated.  · If the infection is more severe, you may need to stay in the hospital and receive antibiotics given directly into a vein through an IV tube. You may also need to receive fluids through an IV tube if you are not able to remain hydrated. After your hospital stay, you may need to take oral antibiotics for a period of time.  Other treatments may be required, depending on the cause of the infection.  Follow these instructions at home:  Medicines   · Take over-the-counter and prescription medicines only as told by your health care provider.  · If you were prescribed an antibiotic medicine, take it as told by your health care provider. Do not stop taking the antibiotic even if you start to feel better.  General instructions   · Drink enough fluid to keep your urine clear or pale yellow.  · Avoid caffeine, tea, and carbonated beverages. They tend to irritate the bladder.  · Urinate often. Avoid holding in urine for long periods of time.  · Urinate before and after sex.  · After a bowel movement, women should cleanse from front to back. Use each tissue only once.  · Keep all follow-up visits as told by your health care provider. This is important.  Contact a health care provider if:  · Your symptoms do not get better after 2 days of treatment.  · Your symptoms get worse.  · You have a fever.  Get help right away if:  · You are unable to take your antibiotics or fluids.  · You have shaking chills.  · You vomit.  · You have severe flank or back pain.  · You have extreme weakness or fainting.  This information is not intended to replace advice given to you by your  health care provider. Make sure you discuss any questions you have with your health care provider.  Document Released: 12/18/2006 Document Revised: 05/25/2017 Document Reviewed: 04/11/2016  Elsevier Interactive Patient Education © 2017 [employer]

## 2018-05-03 NOTE — PROGRESS NOTES
Manjit Lau is a 22 y.o. female.     History of Present Illness   Right sided low back pain and RUQ abdominal pain and pressure to void, decreased urine for the past week  Hx of UTI but asymptomatic in the past has seen urology in the past to have ureteral dilation  She denies hx of kidney stones  Started feeling chills and hot and cold and worsening low back pain on the right yesterday    The following portions of the patient's history were reviewed and updated as appropriate: allergies, current medications, past family history, past medical history, past social history, past surgical history and problem list.    Review of Systems   Constitutional: Positive for chills and fever. Negative for diaphoresis.   HENT: Negative.    Respiratory: Negative.    Gastrointestinal: Positive for abdominal pain. Negative for nausea and vomiting.   Genitourinary: Positive for decreased urine volume, difficulty urinating, flank pain and urgency. Negative for dysuria, hematuria and pelvic pain.   Musculoskeletal: Positive for back pain.   Skin: Negative.    Allergic/Immunologic: Negative.    Neurological: Negative.    Hematological: Negative.    Psychiatric/Behavioral: Positive for sleep disturbance.       Objective   Physical Exam   Constitutional: She is oriented to person, place, and time. She appears well-developed and well-nourished. No distress.   HENT:   Head: Normocephalic.   Nose: Nose normal.   Neck: Neck supple.   Cardiovascular: Normal rate, regular rhythm and normal heart sounds.    Pulmonary/Chest: Effort normal and breath sounds normal.   Abdominal: Soft. Normal appearance and bowel sounds are normal. There is tenderness in the right upper quadrant. There is guarding and CVA tenderness.   Neurological: She is alert and oriented to person, place, and time.   Skin: Skin is warm and dry. No rash noted.   Psychiatric: She has a normal mood and affect. Her behavior is normal. Thought content  normal.   Nursing note and vitals reviewed.        Assessment/Plan   Mercedes was seen today for r side low back pain & pressure w/ urination.    Diagnoses and all orders for this visit:    Acute right flank pain  -     Cancel: CT Abdomen Pelvis Stone Protocol  -     Urine Culture - Urine, Urine, Clean Catch    RUQ abdominal pain  -     Cancel: CT Abdomen Pelvis Stone Protocol    Dysuria  -     POCT urinalysis dipstick, automated  -     phenazopyridine (PYRIDIUM) 100 MG tablet; Take 1 tablet by mouth 3 (Three) Times a Day As Needed for bladder spasms.    Hematuria, unspecified type  -     Cancel: CT Abdomen Pelvis Stone Protocol    Urine leukocytes  -     ciprofloxacin (CIPRO) 500 MG tablet; Take 1 tablet by mouth Every 12 (Twelve) Hours.  -     Urine Culture - Urine, Urine, Clean Catch    Other orders  -     fluconazole (DIFLUCAN) 150 MG tablet; Take 1 tablet by mouth 1 (One) Time for 1 dose.      Urine positive for leukocytes and large amt blood  Exam suggestive of pyelonephritis, unable to get stat CT today due to insurance so I have sent pt to ER for evaluation. Report called to [facility] ER triage.  Pt going immediately to ER

## 2018-05-05 LAB
BACTERIA UR CULT: NO GROWTH
BACTERIA UR CULT: NORMAL

## 2018-10-03 ENCOUNTER — OFFICE VISIT (OUTPATIENT)
Dept: FAMILY MEDICINE CLINIC | Facility: CLINIC | Age: 23
End: 2018-10-03

## 2018-10-03 VITALS
SYSTOLIC BLOOD PRESSURE: 102 MMHG | TEMPERATURE: 99.6 F | OXYGEN SATURATION: 98 % | DIASTOLIC BLOOD PRESSURE: 64 MMHG | HEART RATE: 108 BPM | BODY MASS INDEX: 21.78 KG/M2 | RESPIRATION RATE: 16 BRPM | WEIGHT: 115.2 LBS

## 2018-10-03 DIAGNOSIS — R53.83 OTHER FATIGUE: Primary | ICD-10-CM

## 2018-10-03 DIAGNOSIS — R42 DIZZINESS: ICD-10-CM

## 2018-10-03 PROCEDURE — 99213 OFFICE O/P EST LOW 20 MIN: CPT | Performed by: FAMILY MEDICINE

## 2018-10-03 RX ORDER — MECLIZINE HYDROCHLORIDE 25 MG/1
25 TABLET ORAL 3 TIMES DAILY PRN
Qty: 25 TABLET | Refills: 0 | Status: SHIPPED | OUTPATIENT
Start: 2018-10-03 | End: 2019-08-19

## 2018-10-03 NOTE — PATIENT INSTRUCTIONS
Go to the nearest ER or return to clinic if symptoms worsen, fever/chill develop  Dizziness  Dizziness is a common problem. It makes you feel unsteady or light-headed. You may feel like you are about to pass out (faint). Dizziness can lead to getting hurt if you stumble or fall. Dizziness can be caused by many things, including:  · Medicines.  · Not having enough water in your body (dehydration).  · Illness.    Follow these instructions at home:  Eating and drinking  · Drink enough fluid to keep your pee (urine) clear or pale yellow. This helps to keep you from getting dehydrated. Try to drink more clear fluids, such as water.  · Do not drink alcohol.  · Limit how much caffeine you drink or eat, if your doctor tells you to do that.  · Limit how much salt (sodium) you drink or eat, if your doctor tells you to do that.  Activity  · Avoid making quick movements.  ? When you stand up from sitting in a chair, steady yourself until you feel okay.  ? In the morning, first sit up on the side of the bed. When you feel okay, stand slowly while you hold onto something. Do this until you know that your balance is fine.  · If you need to  one place for a long time, move your legs often. Tighten and relax the muscles in your legs while you are standing.  · Do not drive or use heavy machinery if you feel dizzy.  · Avoid bending down if you feel dizzy. Place items in your home so you can reach them easily without leaning over.  Lifestyle  · Do not use any products that contain nicotine or tobacco, such as cigarettes and e-cigarettes. If you need help quitting, ask your doctor.  · Try to lower your stress level. You can do this by using methods such as yoga or meditation. Talk with your doctor if you need help.  General instructions  · Watch your dizziness for any changes.  · Take over-the-counter and prescription medicines only as told by your doctor. Talk with your doctor if you think that you are dizzy because of a  medicine that you are taking.  · Tell a friend or a family member that you are feeling dizzy. If he or she notices any changes in your behavior, have this person call your doctor.  · Keep all follow-up visits as told by your doctor. This is important.  Contact a doctor if:  · Your dizziness does not go away.  · Your dizziness or light-headedness gets worse.  · You feel sick to your stomach (nauseous).  · You have trouble hearing.  · You have new symptoms.  · You are unsteady on your feet.  · You feel like the room is spinning.  Get help right away if:  · You throw up (vomit) or have watery poop (diarrhea), and you cannot eat or drink anything.  · You have trouble:  ? Talking.  ? Walking.  ? Swallowing.  ? Using your arms, hands, or legs.  · You feel generally weak.  · You are not thinking clearly, or you have trouble forming sentences. A friend or family member may notice this.  · You have:  ? Chest pain.  ? Pain in your belly (abdomen).  ? Shortness of breath.  ? Sweating.  · Your vision changes.  · You are bleeding.  · You have a very bad headache.  · You have neck pain or a stiff neck.  · You have a fever.  These symptoms may be an emergency. Do not wait to see if the symptoms will go away. Get medical help right away. Call your local emergency services (911 in the [place] Do not drive yourself to the hospital.  Summary  · Dizziness makes you feel unsteady or light-headed. You may feel like you are about to pass out (faint).  · Drink enough fluid to keep your pee (urine) clear or pale yellow. Do not drink alcohol.  · Avoid making quick movements if you feel dizzy.  · Watch your dizziness for any changes.  This information is not intended to replace advice given to you by your health care provider. Make sure you discuss any questions you have with your health care provider.  Document Released: 12/06/2012 Document Revised: 01/04/2018 Document Reviewed: 01/04/2018  [employer] Interactive Patient Education © 2017  [employer] Inc[employer]

## 2018-10-03 NOTE — PROGRESS NOTES
Subjective   Holli Camacho is a 23 y.o. female.   Chief Complaint   Patient presents with   • Fatigue     During day at work or school    • Dizziness     History of Present Illness   She has been experiencing fatigue, dizziness, light headedness.    Symptoms have been present x 1 week. Sudden onset.   She has had to take more naps recently.   Denies any recent illness.     The following portions of the patient's history were reviewed and updated as appropriate: allergies, current medications, past family history, past medical history, past social history, past surgical history and problem list.    Review of Systems   Constitutional: Positive for fatigue. Negative for chills and fever.   HENT: Negative for congestion, ear discharge, ear pain, postnasal drip and sore throat.    Eyes: Negative.    Respiratory: Negative for cough and shortness of breath.    Cardiovascular: Negative for chest pain and leg swelling.   Gastrointestinal: Negative for abdominal pain, nausea and vomiting.   Neurological: Positive for dizziness and light-headedness. Negative for confusion.       Objective   Physical Exam   Constitutional: She is oriented to person, place, and time. She appears well-developed and well-nourished.   HENT:   Head: Normocephalic and atraumatic.   Right Ear: Hearing, tympanic membrane, external ear and ear canal normal.   Left Ear: Tympanic membrane, external ear and ear canal normal.   Nose: Nose normal.   Mouth/Throat: Uvula is midline, oropharynx is clear and moist and mucous membranes are normal.   Eyes: Conjunctivae are normal.   Neck: Normal range of motion.   Cardiovascular: Normal rate, regular rhythm and normal heart sounds.    No murmur heard.  Pulmonary/Chest: Effort normal and breath sounds normal.   Musculoskeletal: She exhibits no deformity.   Neurological: She is alert and oriented to person, place, and time.   Skin: Skin is warm and dry.   Psychiatric: She has a normal mood and affect.  Her behavior is normal.   Nursing note and vitals reviewed.        Assessment/Plan   Mercedes was seen today for fatigue and dizziness.    Diagnoses and all orders for this visit:    Other fatigue  -     CBC & Differential; Future  -     Comprehensive Metabolic Panel; Future  -     TSH; Future  -     Vitamin B12; Future    Dizziness  -     CBC & Differential; Future  -     Comprehensive Metabolic Panel; Future  -     TSH; Future  -     Vitamin B12; Future  -     meclizine (ANTIVERT) 25 MG tablet; Take 1 tablet by mouth 3 (Three) Times a Day As Needed for dizziness.      Normal physical exam. Blood pressure is low, however this is normal for her. Low grade fever, but no symptoms of illness.   Advised to monitor and notify me of any new symptoms.  Labs ordered, she will return to complete.  Meclizine provided prn to improve dizziness.

## 2018-10-04 DIAGNOSIS — R42 DIZZINESS: ICD-10-CM

## 2018-10-04 DIAGNOSIS — R53.83 OTHER FATIGUE: ICD-10-CM

## 2018-10-04 LAB
ALBUMIN SERPL-MCNC: 4.21 G/DL (ref 3.2–4.8)
ALBUMIN/GLOB SERPL: 2.1 G/DL (ref 1.5–2.5)
ALP SERPL-CCNC: 48 U/L (ref 25–100)
ALT SERPL-CCNC: 10 U/L (ref 7–40)
AST SERPL-CCNC: 16 U/L (ref 0–33)
BASOPHILS # BLD AUTO: 0.02 10*3/MM3 (ref 0–0.2)
BASOPHILS NFR BLD AUTO: 0.4 % (ref 0–1)
BILIRUB SERPL-MCNC: 0.7 MG/DL (ref 0.3–1.2)
BUN SERPL-MCNC: 10 MG/DL (ref 9–23)
BUN/CREAT SERPL: 14.1 (ref 7–25)
CALCIUM SERPL-MCNC: 9.2 MG/DL (ref 8.7–10.4)
CHLORIDE SERPL-SCNC: 103 MMOL/L (ref 99–109)
CO2 SERPL-SCNC: 26 MMOL/L (ref 20–31)
CREAT SERPL-MCNC: 0.71 MG/DL (ref 0.6–1.3)
EOSINOPHIL # BLD AUTO: 0.01 10*3/MM3 (ref 0–0.3)
EOSINOPHIL NFR BLD AUTO: 0.2 % (ref 0–3)
ERYTHROCYTE [DISTWIDTH] IN BLOOD BY AUTOMATED COUNT: 12.9 % (ref 11.3–14.5)
GLOBULIN SER CALC-MCNC: 2 GM/DL
GLUCOSE SERPL-MCNC: 100 MG/DL (ref 70–100)
HCT VFR BLD AUTO: 42.8 % (ref 34.5–44)
HGB BLD-MCNC: 13.6 G/DL (ref 11.5–15.5)
IMM GRANULOCYTES # BLD: 0.01 10*3/MM3 (ref 0–0.03)
IMM GRANULOCYTES NFR BLD: 0.2 % (ref 0–0.6)
LYMPHOCYTES # BLD AUTO: 2.27 10*3/MM3 (ref 0.6–4.8)
LYMPHOCYTES NFR BLD AUTO: 40.4 % (ref 24–44)
MCH RBC QN AUTO: 30.8 PG (ref 27–31)
MCHC RBC AUTO-ENTMCNC: 31.8 G/DL (ref 32–36)
MCV RBC AUTO: 96.8 FL (ref 80–99)
MONOCYTES # BLD AUTO: 0.45 10*3/MM3 (ref 0–1)
MONOCYTES NFR BLD AUTO: 8 % (ref 0–12)
NEUTROPHILS # BLD AUTO: 2.86 10*3/MM3 (ref 1.5–8.3)
NEUTROPHILS NFR BLD AUTO: 50.8 % (ref 41–71)
PLATELET # BLD AUTO: 223 10*3/MM3 (ref 150–450)
POTASSIUM SERPL-SCNC: 4.1 MMOL/L (ref 3.5–5.5)
PROT SERPL-MCNC: 6.2 G/DL (ref 5.7–8.2)
RBC # BLD AUTO: 4.42 10*6/MM3 (ref 3.89–5.14)
SODIUM SERPL-SCNC: 138 MMOL/L (ref 132–146)
TSH SERPL DL<=0.005 MIU/L-ACNC: 3.92 MIU/ML (ref 0.35–5.35)
VIT B12 SERPL-MCNC: 355 PG/ML (ref 211–911)
WBC # BLD AUTO: 5.62 10*3/MM3 (ref 3.5–10.8)

## 2018-10-29 ENCOUNTER — OFFICE VISIT (OUTPATIENT)
Dept: FAMILY MEDICINE CLINIC | Facility: CLINIC | Age: 23
End: 2018-10-29

## 2018-10-29 VITALS
WEIGHT: 118.5 LBS | TEMPERATURE: 98.6 F | BODY MASS INDEX: 22.37 KG/M2 | SYSTOLIC BLOOD PRESSURE: 104 MMHG | DIASTOLIC BLOOD PRESSURE: 76 MMHG | HEIGHT: 61 IN

## 2018-10-29 DIAGNOSIS — R10.31 RLQ ABDOMINAL PAIN: ICD-10-CM

## 2018-10-29 DIAGNOSIS — H92.01 OTALGIA OF RIGHT EAR: Primary | ICD-10-CM

## 2018-10-29 DIAGNOSIS — H69.81 DYSFUNCTION OF RIGHT EUSTACHIAN TUBE: ICD-10-CM

## 2018-10-29 LAB
B-HCG UR QL: NEGATIVE
BILIRUB BLD-MCNC: NEGATIVE MG/DL
CLARITY, POC: CLEAR
COLOR UR: YELLOW
GLUCOSE UR STRIP-MCNC: NEGATIVE MG/DL
INTERNAL NEGATIVE CONTROL: NEGATIVE
INTERNAL POSITIVE CONTROL: POSITIVE
KETONES UR QL: NEGATIVE
LEUKOCYTE EST, POC: ABNORMAL
Lab: NORMAL
NITRITE UR-MCNC: NEGATIVE MG/ML
PH UR: 6.5 [PH] (ref 5–8)
PROT UR STRIP-MCNC: NEGATIVE MG/DL
RBC # UR STRIP: ABNORMAL /UL
SP GR UR: 1.01 (ref 1–1.03)
UROBILINOGEN UR QL: NORMAL

## 2018-10-29 PROCEDURE — 81025 URINE PREGNANCY TEST: CPT | Performed by: PHYSICIAN ASSISTANT

## 2018-10-29 PROCEDURE — 81003 URINALYSIS AUTO W/O SCOPE: CPT | Performed by: PHYSICIAN ASSISTANT

## 2018-10-29 PROCEDURE — 99213 OFFICE O/P EST LOW 20 MIN: CPT | Performed by: PHYSICIAN ASSISTANT

## 2018-10-29 RX ORDER — METHYLPREDNISOLONE 4 MG/1
TABLET ORAL
Qty: 21 EACH | Refills: 0 | Status: SHIPPED | OUTPATIENT
Start: 2018-10-29 | End: 2019-03-06

## 2018-10-29 RX ORDER — INFLUENZA A VIRUS A/SINGAPORE/GP1908/2015 IVR-180 (H1N1) ANTIGEN (MDCK CELL DERIVED, PROPIOLACTONE INACTIVATED), INFLUENZA A VIRUS A/NORTH CAROLINA/04/2016 (H3N2) HEMAGGLUTININ ANTIGEN (MDCK CELL DERIVED, PROPIOLACTONE INACTIVATED), INFLUENZA B VIRUS B/IOWA/06/2017 HEMAGGLUTININ ANTIGEN (MDCK CELL DERIVED, PROPIOLACTONE INACTIVATED), INFLUENZA B VIRUS B/SINGAPORE/INFTT-16-0610/2016 HEMAGGLUTININ ANTIGEN (MDCK CELL DERIVED, PROPIOLACTONE INACTIVATED) 15; 15; 15; 15 UG/.5ML; UG/.5ML; UG/.5ML; UG/.5ML
INJECTION, SUSPENSION INTRAMUSCULAR
COMMUNITY
Start: 2018-10-11 | End: 2018-10-29

## 2018-10-30 NOTE — PROGRESS NOTES
Subjective   Holli Camacho is a 23 y.o. female.     History of Present Illness   Pt presents with CC of R ear pain for the last few days. Feels like it is popping and their is fluid behind it. Denies any additional symptoms. Denies hearing loss or ringing. No drainage from ear   Pt also report that she has had an IUD in place for about a year now. Once a month she will get sharp RLQ pain that feels severe. Lasts for awhile and then eventually resolves. Going on for 2-3 months.   No known hx of rupture cysts, no fever, N/V, vaginal discharge or bleeding. Has not had really any bleeding since IUD was placed   Pap smears have been normal. Mother has hx of cervical cancer     The following portions of the patient's history were reviewed and updated as appropriate: allergies, current medications, past family history, past medical history, past social history, past surgical history and problem list.    Review of Systems   Constitutional: Negative.  Negative for chills, diaphoresis, fatigue and fever.   HENT: Positive for ear discharge. Negative for congestion, ear pain, hearing loss, nosebleeds, postnasal drip, sinus pressure, sneezing and sore throat.    Eyes: Negative.    Respiratory: Negative.  Negative for cough, chest tightness, shortness of breath and wheezing.    Cardiovascular: Negative.  Negative for chest pain, palpitations and leg swelling.   Gastrointestinal: Positive for abdominal pain. Negative for abdominal distention, anal bleeding, blood in stool, constipation, diarrhea, nausea, rectal pain and vomiting.   Endocrine: Negative.  Negative for cold intolerance, heat intolerance, polydipsia, polyphagia and polyuria.   Genitourinary: Positive for pelvic pain. Negative for difficulty urinating, dysuria, flank pain, frequency, hematuria and urgency.   Musculoskeletal: Negative.  Negative for arthralgias, back pain, gait problem, joint swelling, myalgias, neck pain and neck stiffness.   Skin:  "Negative.  Negative for color change, pallor, rash and wound.   Allergic/Immunologic: Negative.  Negative for immunocompromised state.   Neurological: Negative for dizziness, syncope, weakness, light-headedness, numbness and headaches.   Hematological: Negative.  Negative for adenopathy. Does not bruise/bleed easily.   Psychiatric/Behavioral: Negative.  Negative for behavioral problems, confusion, self-injury, sleep disturbance and suicidal ideas. The patient is not nervous/anxious.        Objective    Blood pressure 104/76, temperature 98.6 °F (37 °C), height 154.9 cm (61\"), weight 53.8 kg (118 lb 8 oz).     Physical Exam   Constitutional: She is oriented to person, place, and time. She appears well-developed and well-nourished.   HENT:   Head: Normocephalic and atraumatic.   Right Ear: External ear and ear canal normal. Tympanic membrane is erythematous. Tympanic membrane is not perforated, not retracted and not bulging.   Left Ear: Tympanic membrane, external ear and ear canal normal.   Nose: Nose normal.   Mouth/Throat: Oropharynx is clear and moist. No oropharyngeal exudate.   Eyes: Pupils are equal, round, and reactive to light. Conjunctivae and EOM are normal.   Neck: Normal range of motion. Neck supple. No tracheal deviation present. No thyromegaly present.   Cardiovascular: Normal rate, regular rhythm, normal heart sounds and intact distal pulses.  Exam reveals no gallop and no friction rub.    No murmur heard.  Pulmonary/Chest: Effort normal and breath sounds normal. No respiratory distress. She has no wheezes. She has no rales. She exhibits no tenderness.   Abdominal: Soft. Bowel sounds are normal. She exhibits no distension and no mass. There is tenderness in the right lower quadrant. There is guarding. There is no rebound. No hernia.   Musculoskeletal: She exhibits no deformity.   Lymphadenopathy:     She has no cervical adenopathy.   Neurological: She is alert and oriented to person, place, and time. " "She has normal reflexes.   Skin: Skin is warm and dry.   Psychiatric: She has a normal mood and affect. Her behavior is normal. Judgment and thought content normal.   Nursing note and vitals reviewed.      Assessment/Plan   Holli was seen today for earache.    Diagnoses and all orders for this visit:    Otalgia of right ear  -     MethylPREDNISolone (MEDROL, LEODAN,) 4 MG tablet; Take as directed on package instructions.    Dysfunction of right eustachian tube  -     MethylPREDNISolone (MEDROL, LEODAN,) 4 MG tablet; Take as directed on package instructions.    RLQ abdominal pain  -     POCT urinalysis dipstick, automated  -     POCT pregnancy, urine    ETD treatment as outlined in plan   Suspect RLQ pain may be related to enlarged ovarian cysts. UA showed mild leuk and blood which is \"normal for patient\". Unable to send for culture, but will provide another sample that we can send off to verify no infection. UPT negative. Encourage patient to f/u with her GYN for pelvic concerns. May need TVUS for additional evaluation as well as make sure IUD is in correct location. Report to ER if new or worsening symptoms develop. Pt agrees.            "

## 2019-03-06 ENCOUNTER — OFFICE VISIT (OUTPATIENT)
Dept: FAMILY MEDICINE CLINIC | Facility: CLINIC | Age: 24
End: 2019-03-06

## 2019-03-06 VITALS
SYSTOLIC BLOOD PRESSURE: 112 MMHG | WEIGHT: 120 LBS | TEMPERATURE: 97.6 F | BODY MASS INDEX: 22.67 KG/M2 | DIASTOLIC BLOOD PRESSURE: 68 MMHG | RESPIRATION RATE: 16 BRPM | HEART RATE: 68 BPM

## 2019-03-06 DIAGNOSIS — H69.81 ETD (EUSTACHIAN TUBE DYSFUNCTION), RIGHT: ICD-10-CM

## 2019-03-06 DIAGNOSIS — H65.02 ACUTE SEROUS OTITIS MEDIA OF LEFT EAR, RECURRENCE NOT SPECIFIED: Primary | ICD-10-CM

## 2019-03-06 PROCEDURE — 99213 OFFICE O/P EST LOW 20 MIN: CPT | Performed by: FAMILY MEDICINE

## 2019-03-06 RX ORDER — AZITHROMYCIN 250 MG/1
TABLET, FILM COATED ORAL
Qty: 6 TABLET | Refills: 0 | Status: SHIPPED | OUTPATIENT
Start: 2019-03-06 | End: 2019-07-23

## 2019-03-06 RX ORDER — PREDNISONE 10 MG/1
TABLET ORAL
Qty: 21 TABLET | Refills: 0 | Status: SHIPPED | OUTPATIENT
Start: 2019-03-06 | End: 2019-07-23

## 2019-03-06 RX ORDER — FLUTICASONE PROPIONATE 50 MCG
2 SPRAY, SUSPENSION (ML) NASAL DAILY
Qty: 18.2 ML | Refills: 2 | Status: SHIPPED | OUTPATIENT
Start: 2019-03-06 | End: 2019-07-23

## 2019-03-06 NOTE — PATIENT INSTRUCTIONS
Go to the nearest ER or return to clinic if symptoms worsen, fever/chill develop      Otitis Media, Adult  Otitis media is redness, soreness, and puffiness (swelling) in the space just behind your eardrum (middle ear). It may be caused by allergies or infection. It often happens along with a cold.  Follow these instructions at home:  · Take your medicine as told. Finish it even if you start to feel better.  · Only take over-the-counter or prescription medicines for pain, discomfort, or fever as told by your doctor.  · Follow up with your doctor as told.  Contact a doctor if:  · You have otitis media only in one ear, or bleeding from your nose, or both.  · You notice a lump on your neck.  · You are not getting better in 3-5 days.  · You feel worse instead of better.  Get help right away if:  · You have pain that is not helped with medicine.  · You have puffiness, redness, or pain around your ear.  · You get a stiff neck.  · You cannot move part of your face (paralysis).  · You notice that the bone behind your ear hurts when you touch it.  This information is not intended to replace advice given to you by your health care provider. Make sure you discuss any questions you have with your health care provider.  Document Released: 06/05/2009 Document Revised: 05/25/2017 Document Reviewed: 07/15/2014  [employer] Interactive Patient Education © 2017 [employer]

## 2019-03-06 NOTE — PROGRESS NOTES
Subjective   Holli Camacho is a 23 y.o. female.   Chief Complaint   Patient presents with   • Earache     bilateral x 1 week     Earache    There is pain in both ears. This is a new problem. The current episode started in the past 7 days. The problem occurs every few hours. The problem has been waxing and waning. There has been no fever. The fever has been present for less than 1 day. The pain is at a severity of 3/10. Associated symptoms include headaches and neck pain. Pertinent negatives include no coughing, ear discharge, hearing loss or sore throat. She has tried NSAIDs for the symptoms. The treatment provided mild relief.        The following portions of the patient's history were reviewed and updated as appropriate: allergies, current medications, past family history, past medical history, past social history, past surgical history and problem list.    Review of Systems   Constitutional: Negative for fever.   HENT: Positive for ear pain. Negative for ear discharge, hearing loss and sore throat.    Respiratory: Negative for cough.    Cardiovascular: Negative.    Gastrointestinal: Negative.    Musculoskeletal: Positive for neck pain. Negative for neck stiffness.   Neurological: Positive for headache.       Objective   Physical Exam   Constitutional: She appears well-developed and well-nourished.   HENT:   Head: Normocephalic and atraumatic.   Right Ear: Hearing, external ear and ear canal normal. Tympanic membrane is not injected and not erythematous. A middle ear effusion (clear fluid) is present.   Left Ear: External ear normal. There is tenderness. Tympanic membrane is erythematous. A middle ear effusion is present.   Nose: Congestion present.   Mouth/Throat: Uvula is midline, oropharynx is clear and moist and mucous membranes are normal.   Eyes: Conjunctivae are normal.   Neck: Neck supple.   Cardiovascular: Normal rate, regular rhythm and normal heart sounds.   Pulmonary/Chest: Effort normal  and breath sounds normal.   Lymphadenopathy:     She has no cervical adenopathy.   Neurological: She is alert.   Skin: Skin is warm.   Nursing note and vitals reviewed.        Assessment/Plan   Holli was seen today for earache.    Diagnoses and all orders for this visit:    Acute serous otitis media of left ear, recurrence not specified  -     predniSONE (DELTASONE) 10 MG tablet; Take 60 mg po day 1, 50 mg day 2, 40 mg day 3, 30 mg day 4, 20 mg day 5, 10 mg day 6  -     azithromycin (ZITHROMAX) 250 MG tablet; Take 2 tablets the first day, then 1 tablet daily for 4 days.    ETD (Eustachian tube dysfunction), right  -     fluticasone (FLONASE) 50 MCG/ACT nasal spray; 2 sprays into the nostril(s) as directed by provider Daily.      Follow up if no improvement.

## 2019-03-22 DIAGNOSIS — R11.0 CHRONIC NAUSEA: ICD-10-CM

## 2019-03-22 RX ORDER — ONDANSETRON 4 MG/1
TABLET, ORALLY DISINTEGRATING ORAL
Qty: 30 TABLET | Refills: 0 | OUTPATIENT
Start: 2019-03-22

## 2019-07-23 ENCOUNTER — OFFICE VISIT (OUTPATIENT)
Dept: FAMILY MEDICINE CLINIC | Facility: CLINIC | Age: 24
End: 2019-07-23

## 2019-07-23 VITALS
HEIGHT: 61 IN | TEMPERATURE: 97.8 F | BODY MASS INDEX: 23.6 KG/M2 | DIASTOLIC BLOOD PRESSURE: 72 MMHG | RESPIRATION RATE: 16 BRPM | HEART RATE: 66 BPM | WEIGHT: 125 LBS | SYSTOLIC BLOOD PRESSURE: 100 MMHG

## 2019-07-23 DIAGNOSIS — H66.002 ACUTE SUPPURATIVE OTITIS MEDIA OF LEFT EAR WITHOUT SPONTANEOUS RUPTURE OF TYMPANIC MEMBRANE, RECURRENCE NOT SPECIFIED: Primary | ICD-10-CM

## 2019-07-23 PROCEDURE — 99213 OFFICE O/P EST LOW 20 MIN: CPT | Performed by: PHYSICIAN ASSISTANT

## 2019-07-23 RX ORDER — AMOXICILLIN 500 MG/1
1000 CAPSULE ORAL 2 TIMES DAILY
Qty: 40 CAPSULE | Refills: 0 | Status: SHIPPED | OUTPATIENT
Start: 2019-07-23 | End: 2019-08-19

## 2019-07-23 RX ORDER — METHYLPREDNISOLONE 4 MG/1
TABLET ORAL
Qty: 21 EACH | Refills: 0 | Status: SHIPPED | OUTPATIENT
Start: 2019-07-23 | End: 2019-08-19

## 2019-07-23 NOTE — PROGRESS NOTES
"Manjit Camacho is a 23 y.o. female.     Earache    There is pain in the left ear. This is a new problem. The current episode started yesterday. The problem occurs constantly. The problem has been unchanged. There has been no fever. The pain is at a severity of 4/10. The pain is moderate. Associated symptoms include neck pain. Pertinent negatives include no abdominal pain, coughing, diarrhea, ear discharge, headaches, hearing loss, rash, rhinorrhea, sore throat or vomiting. She has tried nothing for the symptoms.        The following portions of the patient's history were reviewed and updated as appropriate: allergies, current medications, past family history, past medical history, past social history, past surgical history and problem list.    Review of Systems   Constitutional: Negative for diaphoresis, fatigue and fever.   HENT: Positive for ear pain. Negative for ear discharge, hearing loss, rhinorrhea and sore throat.    Respiratory: Negative for cough.    Gastrointestinal: Negative for abdominal pain, diarrhea and vomiting.   Musculoskeletal: Positive for neck pain.   Skin: Negative for rash.   Neurological: Negative for headaches.       Objective    Blood pressure 100/72, pulse 66, temperature 97.8 °F (36.6 °C), resp. rate 16, height 154.9 cm (61\"), weight 56.7 kg (125 lb).     Physical Exam   Constitutional: She is oriented to person, place, and time. She appears well-developed and well-nourished.   HENT:   Head: Normocephalic and atraumatic.   Right Ear: Tympanic membrane, external ear and ear canal normal. No drainage, swelling or tenderness. No decreased hearing is noted.   Left Ear: External ear and ear canal normal. No drainage, swelling or tenderness. Tympanic membrane is erythematous and bulging. Tympanic membrane is not perforated. No decreased hearing is noted.   Nose: Nose normal. Right sinus exhibits no maxillary sinus tenderness and no frontal sinus tenderness. Left sinus " exhibits no maxillary sinus tenderness and no frontal sinus tenderness.   Mouth/Throat: Oropharynx is clear and moist. No oropharyngeal exudate, posterior oropharyngeal edema or posterior oropharyngeal erythema.   Eyes: Conjunctivae are normal.   Neck: Normal range of motion. Neck supple. No tracheal deviation present. No thyromegaly present.   Cardiovascular: Normal rate, regular rhythm, normal heart sounds and intact distal pulses. Exam reveals no gallop and no friction rub.   No murmur heard.  Pulmonary/Chest: Effort normal and breath sounds normal. No respiratory distress. She has no wheezes. She has no rales. She exhibits no tenderness.   Lymphadenopathy:     She has cervical adenopathy.   Neurological: She is alert and oriented to person, place, and time.   Skin: Skin is warm and dry.   Psychiatric: She has a normal mood and affect. Her behavior is normal. Judgment and thought content normal.   Nursing note and vitals reviewed.      Assessment/Plan   Holli was seen today for earache.    Diagnoses and all orders for this visit:    Acute suppurative otitis media of left ear without spontaneous rupture of tympanic membrane, recurrence not specified  -     amoxicillin (AMOXIL) 500 MG capsule; Take 2 capsules by mouth 2 (Two) Times a Day.  -     methylPREDNISolone (MEDROL, LEODAN,) 4 MG tablet; Take as directed on package instructions.    treatment as outlined in plan. F.U INB     Update: pt did follow up with GYN last year with abdominal concerns as directed and discovered she had endometriosis. Had surgery to remove adhesions and is doing better. [facility]

## 2019-07-24 NOTE — PROGRESS NOTES
I have reviewed the notes, assessments, and/or procedures performed by Devorah Bliss, I concur with her documentation of Holli Camacho.

## 2019-08-19 ENCOUNTER — OFFICE VISIT (OUTPATIENT)
Dept: FAMILY MEDICINE CLINIC | Facility: CLINIC | Age: 24
End: 2019-08-19

## 2019-08-19 VITALS
SYSTOLIC BLOOD PRESSURE: 110 MMHG | WEIGHT: 124 LBS | HEART RATE: 68 BPM | HEIGHT: 61 IN | DIASTOLIC BLOOD PRESSURE: 65 MMHG | BODY MASS INDEX: 23.41 KG/M2 | TEMPERATURE: 98.1 F | RESPIRATION RATE: 16 BRPM

## 2019-08-19 DIAGNOSIS — H69.82 DYSFUNCTION OF LEFT EUSTACHIAN TUBE: Primary | ICD-10-CM

## 2019-08-19 PROCEDURE — 99213 OFFICE O/P EST LOW 20 MIN: CPT | Performed by: PHYSICIAN ASSISTANT

## 2019-08-19 NOTE — PROGRESS NOTES
"Manjit Camacho is a 24 y.o. female.     History of Present Illness   Pt continues with L ear issues. Will hear popping, cracking, and fullness anytime she yawns. No pain right now. Been going on for over one month. Denies decreased hearing. No drainage from ear. Has been using nasal spray as directed and did complete steroid pack from last visit.     The following portions of the patient's history were reviewed and updated as appropriate: allergies, current medications, past family history, past medical history, past social history, past surgical history and problem list.    Review of Systems   Constitutional: Negative.  Negative for chills, diaphoresis, fatigue and fever.   HENT: Negative.  Negative for congestion, ear discharge, ear pain, hearing loss, nosebleeds, postnasal drip, sinus pressure, sneezing and sore throat.    Eyes: Negative.    Respiratory: Negative.  Negative for cough, chest tightness, shortness of breath and wheezing.    Cardiovascular: Negative.  Negative for chest pain, palpitations and leg swelling.   Gastrointestinal: Negative for abdominal distention, abdominal pain, blood in stool, constipation, diarrhea, nausea and vomiting.   Genitourinary: Negative.  Negative for difficulty urinating, dysuria, flank pain, frequency, hematuria and urgency.   Musculoskeletal: Negative.  Negative for arthralgias, back pain, gait problem, joint swelling, myalgias, neck pain and neck stiffness.   Skin: Negative.  Negative for color change, pallor, rash and wound.   Neurological: Negative for dizziness, syncope, weakness, light-headedness, numbness and headaches.       Objective    Blood pressure 110/65, pulse 68, temperature 98.1 °F (36.7 °C), resp. rate 16, height 154.9 cm (60.98\"), weight 56.2 kg (124 lb).     Physical Exam   Constitutional: She is oriented to person, place, and time. She appears well-developed and well-nourished.   HENT:   Head: Normocephalic and atraumatic. "   Right Ear: External ear and ear canal normal. Tympanic membrane is not injected, not scarred, not perforated, not erythematous, not retracted and not bulging.   Left Ear: External ear and ear canal normal. Tympanic membrane is bulging. Tympanic membrane is not injected, not scarred, not perforated and not erythematous.   Nose: Nose normal. Right sinus exhibits no maxillary sinus tenderness and no frontal sinus tenderness. Left sinus exhibits no maxillary sinus tenderness and no frontal sinus tenderness.   Mouth/Throat: Oropharynx is clear and moist. No oropharyngeal exudate, posterior oropharyngeal edema or posterior oropharyngeal erythema.   Eyes: Conjunctivae are normal.   Neck: Normal range of motion. Neck supple. No tracheal deviation present. No thyromegaly present.   Cardiovascular: Normal rate, regular rhythm and normal heart sounds.   Pulmonary/Chest: Effort normal and breath sounds normal. No respiratory distress. She has no wheezes. She has no rales. She exhibits no tenderness.   Lymphadenopathy:     She has no cervical adenopathy.   Neurological: She is alert and oriented to person, place, and time.   Skin: Skin is warm and dry.   Psychiatric: She has a normal mood and affect. Her behavior is normal. Judgment and thought content normal.   Nursing note and vitals reviewed.      Assessment/Plan   Holli was seen today for left ear pain.    Diagnoses and all orders for this visit:    Dysfunction of left eustachian tube  -     Ambulatory Referral to ENT (Otolaryngology)    continue with steroid nasal spray and start antihistamine. Will refer to ENT for ongoing symptoms and suspected ETD.

## 2019-08-19 NOTE — PROGRESS NOTES
I have reviewed the notes, assessments, and/or procedures performed by DEEP Peoples, I concur with her/his documentation of Holli Camacho.

## 2019-09-16 ENCOUNTER — OFFICE VISIT (OUTPATIENT)
Dept: FAMILY MEDICINE CLINIC | Facility: CLINIC | Age: 24
End: 2019-09-16

## 2019-09-16 VITALS
TEMPERATURE: 97.9 F | SYSTOLIC BLOOD PRESSURE: 98 MMHG | DIASTOLIC BLOOD PRESSURE: 66 MMHG | RESPIRATION RATE: 18 BRPM | BODY MASS INDEX: 23.32 KG/M2 | HEIGHT: 61 IN | WEIGHT: 123.5 LBS | HEART RATE: 84 BPM

## 2019-09-16 DIAGNOSIS — F41.9 ANXIETY: Primary | ICD-10-CM

## 2019-09-16 PROCEDURE — 99213 OFFICE O/P EST LOW 20 MIN: CPT | Performed by: FAMILY MEDICINE

## 2019-09-16 RX ORDER — BUSPIRONE HYDROCHLORIDE 7.5 MG/1
TABLET ORAL
Qty: 90 TABLET | Refills: 5 | Status: SHIPPED | OUTPATIENT
Start: 2019-09-16 | End: 2020-03-25 | Stop reason: SDUPTHER

## 2019-09-16 NOTE — PATIENT INSTRUCTIONS
Go to the nearest ER or return to clinic if symptoms worsen, fever/chill develop    Living With Anxiety    After being diagnosed with an anxiety disorder, you may be relieved to know why you have felt or behaved a certain way. It is natural to also feel overwhelmed about the treatment ahead and what it will mean for your life. With care and support, you can manage this condition and recover from it.  How to cope with anxiety  Dealing with stress  Stress is your body’s reaction to life changes and events, both good and bad. Stress can last just a few hours or it can be ongoing. Stress can play a major role in anxiety, so it is important to learn both how to cope with stress and how to think about it differently.  Talk with your health care provider or a counselor to learn more about stress reduction. He or she may suggest some stress reduction techniques, such as:  · Music therapy. This can include creating or listening to music that you enjoy and that inspires you.  · Mindfulness-based meditation. This involves being aware of your normal breaths, rather than trying to control your breathing. It can be done while sitting or walking.  · Centering prayer. This is a kind of meditation that involves focusing on a word, phrase, or sacred image that is meaningful to you and that brings you peace.  · Deep breathing. To do this, expand your stomach and inhale slowly through your nose. Hold your breath for 3-5 seconds. Then exhale slowly, allowing your stomach muscles to relax.  · Self-talk. This is a skill where you identify thought patterns that lead to anxiety reactions and correct those thoughts.  · Muscle relaxation. This involves tensing muscles then relaxing them.  Choose a stress reduction technique that fits your lifestyle and personality. Stress reduction techniques take time and practice. Set aside 5-15 minutes a day to do them. Therapists can offer training in these techniques. The training may be covered by some  insurance plans. Other things you can do to manage stress include:  · Keeping a stress diary. This can help you learn what triggers your stress and ways to control your response.  · Thinking about how you respond to certain situations. You may not be able to control everything, but you can control your reaction.  · Making time for activities that help you relax, and not feeling guilty about spending your time in this way.  Therapy combined with coping and stress-reduction skills provides the best chance for successful treatment.  Medicines  Medicines can help ease symptoms. Medicines for anxiety include:  · Anti-anxiety drugs.  · Antidepressants.  · Beta-blockers.  Medicines may be used as the main treatment for anxiety disorder, along with therapy, or if other treatments are not working. Medicines should be prescribed by a health care provider.  Relationships  Relationships can play a big part in helping you recover. Try to spend more time connecting with trusted friends and family members. Consider going to couples counseling, taking family education classes, or going to family therapy. Therapy can help you and others better understand the condition.  How to recognize changes in your condition  Everyone has a different response to treatment for anxiety. Recovery from anxiety happens when symptoms decrease and stop interfering with your daily activities at home or work. This may mean that you will start to:  · Have better concentration and focus.  · Sleep better.  · Be less irritable.  · Have more energy.  · Have improved memory.  It is important to recognize when your condition is getting worse. Contact your health care provider if your symptoms interfere with home or work and you do not feel like your condition is improving.  Where to find help and support:  You can get help and support from these sources:  · Self-help groups.  · Online and community organizations.  · A trusted spiritual leader.  · Couples  counseling.  · Family education classes.  · Family therapy.  Follow these instructions at home:  · Eat a healthy diet that includes plenty of vegetables, fruits, whole grains, low-fat dairy products, and lean protein. Do not eat a lot of foods that are high in solid fats, added sugars, or salt.  · Exercise. Most adults should do the following:  ? Exercise for at least 150 minutes each week. The exercise should increase your heart rate and make you sweat (moderate-intensity exercise).  ? Strengthening exercises at least twice a week.  · Cut down on caffeine, tobacco, alcohol, and other potentially harmful substances.  · Get the right amount and quality of sleep. Most adults need 7-9 hours of sleep each night.  · Make choices that simplify your life.  · Take over-the-counter and prescription medicines only as told by your health care provider.  · Avoid caffeine, alcohol, and certain over-the-counter cold medicines. These may make you feel worse. Ask your pharmacist which medicines to avoid.  · Keep all follow-up visits as told by your health care provider. This is important.  Questions to ask your health care provider  · Would I benefit from therapy?  · How often should I follow up with a health care provider?  · How long do I need to take medicine?  · Are there any long-term side effects of my medicine?  · Are there any alternatives to taking medicine?  Contact a health care provider if:  · You have a hard time staying focused or finishing daily tasks.  · You spend many hours a day feeling worried about everyday life.  · You become exhausted by worry.  · You start to have headaches, feel tense, or have nausea.  · You urinate more than normal.  · You have diarrhea.  Get help right away if:  · You have a racing heart and shortness of breath.  · You have thoughts of hurting yourself or others.  If you ever feel like you may hurt yourself or others, or have thoughts about taking your own life, get help right away. You  can go to your nearest emergency department or call:  · Your local emergency services (911 in the [place]  · A suicide crisis helpline, such as the National Suicide Prevention Lifeline at 1-911.565.5108. This is open 24-hours a day.  Summary  · Taking steps to deal with stress can help calm you.  · Medicines cannot cure anxiety disorders, but they can help ease symptoms.  · Family, friends, and partners can play a big part in helping you recover from an anxiety disorder.  This information is not intended to replace advice given to you by your health care provider. Make sure you discuss any questions you have with your health care provider.  Document Released: 12/12/2017 Document Revised: 12/12/2017 Document Reviewed: 12/12/2017  [employer] Interactive Patient Education © 2019 [employer]

## 2019-09-16 NOTE — PROGRESS NOTES
Subjective   Holli Camacho is a 24 y.o. female.   Chief Complaint   Patient presents with   • Anxiety     Anxiety   Presents for initial visit. Onset was 1 to 5 years ago. The problem has been gradually worsening. Symptoms include excessive worry, insomnia, irritability and nervous/anxious behavior. Patient reports no depressed mood or panic. Symptoms occur constantly. The severity of symptoms is causing significant distress. The symptoms are aggravated by work stress (Started new job last week.). The quality of sleep is poor. Nighttime awakenings: several.     There are no known risk factors. Her past medical history is significant for anxiety/panic attacks. There is no history of bipolar disorder, depression or fibromyalgia. Past treatments include SSRIs and non-benzodiazephine anxiolytics. The treatment provided significant relief.   She currently isn't taking any treatment. In the past, Celexa caused nightmares. Did well with buspirone previously.      The following portions of the patient's history were reviewed and updated as appropriate: allergies, current medications, past family history, past medical history, past social history, past surgical history and problem list.    Review of Systems   Constitutional: Positive for irritability.   Respiratory: Negative.    Cardiovascular: Negative.    Psychiatric/Behavioral: Positive for sleep disturbance and stress. Negative for behavioral problems and depressed mood. The patient is nervous/anxious and has insomnia.        Objective   Physical Exam   Constitutional: She is oriented to person, place, and time. She appears well-developed and well-nourished.   HENT:   Head: Normocephalic and atraumatic.   Nose: Nose normal.   Eyes: Conjunctivae are normal.   Cardiovascular: Normal rate and regular rhythm.   Pulmonary/Chest: Effort normal.   Musculoskeletal: She exhibits no deformity.   Neurological: She is alert and oriented to person, place, and time.    Psychiatric: She has a normal mood and affect. Her behavior is normal. Judgment and thought content normal.   Nursing note and vitals reviewed.        Assessment/Plan   Problems Addressed this Visit        Other    Anxiety - Primary    Relevant Medications    busPIRone (BUSPAR) 7.5 MG tablet        Buspirone to improve anxiety.   Follow up if no improvement of symptoms, consider adding SSRI.

## 2020-02-17 ENCOUNTER — OFFICE VISIT (OUTPATIENT)
Dept: OBSTETRICS AND GYNECOLOGY | Facility: CLINIC | Age: 25
End: 2020-02-17

## 2020-02-17 VITALS
DIASTOLIC BLOOD PRESSURE: 70 MMHG | HEIGHT: 62 IN | BODY MASS INDEX: 22.82 KG/M2 | WEIGHT: 124 LBS | SYSTOLIC BLOOD PRESSURE: 114 MMHG

## 2020-02-17 DIAGNOSIS — R10.2 PELVIC PAIN: Primary | ICD-10-CM

## 2020-02-17 DIAGNOSIS — N80.9 ENDOMETRIOSIS: ICD-10-CM

## 2020-02-17 PROCEDURE — 99203 OFFICE O/P NEW LOW 30 MIN: CPT | Performed by: OBSTETRICS & GYNECOLOGY

## 2020-02-17 NOTE — PROGRESS NOTES
Subjective   Chief Complaint   Patient presents with   • Endometriosis     Holli Camacho is a 24 y.o. year old  presenting to be seen because of history of endometriosis.  She is also having some irregular cycles.   Current birth control method: .IUD - Kathy; placed 2017.  Unfortunately she had a syncopal episode at that time.  Does not sound like she was given Motrin beforehand etc.  She does have some cramping with her periods as a really taking the medication for that.  She does have some pain with intercourse not as bad as it was in 2018 before her surgery.  This was done in [place] by  doctor Dr. Trey Rodrigues who is since left the area.  Mention that there was some endometriosis on the left ovary but no staging etc. was discussed no treatment was discussed other than I suppose leaving her IUD in place.  She was under the impression he may not of known that much about endometriosis.  I do not know Dr. Rodrigues personally.  Currently she is also has some pain on the right side that is worse there is a constant discomfort.  She sometimes sleeps with a heating pad.  Gets hunched over because of the pain.  Pap test is up-to-date normal 2019.         No LMP recorded. Patient has had an implant.  Cycle Frequency:  Absent due to Kathy IUD although she has started spotting a bit lately over the past month.                           The following portions of the patient's history were reviewed and updated as appropriate:She  has a past medical history of Endometriosis.  She does not have any pertinent problems on file.  She  has a past surgical history that includes tonsillectomy and adenoidectomy (); Umbilical hernia repair (2016); Sabin tooth extraction (2012); Cholecystectomy (2014); and Diagnostic laparoscopy (2018).  Her family history includes Cancer in her mother; No Known Problems in her father; Uterine cancer in her mother.  She  reports that she has never  "smoked. She has never used smokeless tobacco. She reports that she does not drink alcohol or use drugs.  She has No Known Allergies.    Current Outpatient Medications:   •  busPIRone (BUSPAR) 7.5 MG tablet, Take 1 tab po 2 to 3 times per day, Disp: 90 tablet, Rfl: 5  •  Levonorgestrel (DUC) 13.5 MG intrauterine device IUD, Duc 14 mcg/24 hrs (3 yrs) 13.5 mg intrauterine device  Take by intrauterine route., Disp: , Rfl:   •  Elagolix Sodium (ORILISSA) 150 MG tablet, Take 150 mg by mouth Daily., Disp: 30 tablet, Rfl: 0     Review of Systems no constipation urinary leakage chest pain rashes sadness headaches double vision cough fatigue.  Medical illness: Positive for depression/anxiety no thyroid arthritis cholesterol diabetes seizures asthma hypertension  GYN questionnaire: Positive endometriosis no STD cyst fibroids abuse or abnormal Pap smears  Social history she is  does not smoke drink or use illegal drugs.     Objective   /70   Ht 156.2 cm (61.5\")   Wt 56.2 kg (124 lb)   Breastfeeding No   BMI 23.05 kg/m²     General:  well developed; well nourished  no acute distress  appears stated age   Skin:  No suspicious lesions seen   Thyroid: not examined   Lungs:  breathing is unlabored   Heart:  Not performed.       Abdomen: no umbilical or inguinal hernias are present  no hepato-splenomegaly  Some tenderness in general right and left lower quadrants and midline   Pelvis: Clinical staff was present for exam  External genitalia:  normal appearance of the external genitalia including Bartholin's and Whitehouse's glands.  :  urethral meatus normal; bladder tender to palpation;  Vaginal:  normal pink mucosa without prolapse or lesions.  Uterus:  normal size, shape and consistency.  Adnexa:  tenderness of the bilateral adnexa to  superficial and deep palpation;  Rectal:  digital rectal exam not performed; anus visually normal appearing.     Lab Review   CBC, CMP, PATHOLOGY  , TSH and Urine culture    Imaging "   No data reviewed       Assessment   1. History of endometriosis.  Unknown stage.  Laparoscopy done by an [facility] doctor at [facility] he has since left the area will need to get the operative report.  Options given since she just had a laparoscopy in 2018 I think it would make sense to try Orilissa rather than operate again particularly this early.  No masses noted on exam today.  2. Amenorrhea associated with Kathy IUD which will need to be replaced probably with Kyleena.  She had a vasovagal reaction during placement did not take Motrin etc. prior to placement of the IUD.  Therefore I like her to take both Cytotec and Motrin the night before and the morning of if she decides to remove and replace an IUD in the same day.     Plan   1. Would like to start Orilissa continue Kathy for birth control possibly switch to Kyleena  2. Sign release of information for operative note  3. Follow-up in about 5 weeks    \No orders of the defined types were placed in this encounter.    New Medications Ordered This Visit   Medications   • Elagolix Sodium (ORILISSA) 150 MG tablet     Sig: Take 150 mg by mouth Daily.     Dispense:  30 tablet     Refill:  0          This note was electronically signed.    Michel Triplett MD  February 17, 2020

## 2020-03-04 PROBLEM — Z97.5 IUD (INTRAUTERINE DEVICE) IN PLACE: Status: ACTIVE | Noted: 2020-03-04

## 2020-03-04 PROBLEM — N80.9 ENDOMETRIOSIS DETERMINED BY LAPAROSCOPY: Status: ACTIVE | Noted: 2020-03-04

## 2020-03-16 ENCOUNTER — TELEPHONE (OUTPATIENT)
Dept: OBSTETRICS AND GYNECOLOGY | Facility: CLINIC | Age: 25
End: 2020-03-16

## 2020-03-16 RX ORDER — MISOPROSTOL 100 UG/1
TABLET ORAL
Qty: 2 TABLET | Refills: 0 | Status: SHIPPED | OUTPATIENT
Start: 2020-03-16 | End: 2020-03-25

## 2020-03-16 NOTE — TELEPHONE ENCOUNTER
Pt called asked if could call in her  Cytotec this week for her so she can have it in time for her mirena insertion next week

## 2020-03-25 ENCOUNTER — OFFICE VISIT (OUTPATIENT)
Dept: FAMILY MEDICINE CLINIC | Facility: CLINIC | Age: 25
End: 2020-03-25

## 2020-03-25 VITALS
BODY MASS INDEX: 23 KG/M2 | HEART RATE: 70 BPM | HEIGHT: 62 IN | TEMPERATURE: 98.1 F | OXYGEN SATURATION: 98 % | WEIGHT: 125 LBS | DIASTOLIC BLOOD PRESSURE: 62 MMHG | SYSTOLIC BLOOD PRESSURE: 98 MMHG

## 2020-03-25 DIAGNOSIS — H69.83 ETD (EUSTACHIAN TUBE DYSFUNCTION), BILATERAL: Primary | ICD-10-CM

## 2020-03-25 DIAGNOSIS — F41.9 ANXIETY: ICD-10-CM

## 2020-03-25 PROCEDURE — 99214 OFFICE O/P EST MOD 30 MIN: CPT | Performed by: FAMILY MEDICINE

## 2020-03-25 RX ORDER — BUSPIRONE HYDROCHLORIDE 7.5 MG/1
TABLET ORAL
Qty: 90 TABLET | Refills: 5 | Status: SHIPPED | OUTPATIENT
Start: 2020-03-25

## 2020-03-25 NOTE — PROGRESS NOTES
Subjective   Holli Camacho is a 24 y.o. female.   Chief Complaint   Patient presents with   • 6mo f/u Buspar   • Bilateral ear pain     x 1wk      Anxiety   Presents for follow-up visit. Symptoms include nervous/anxious behavior. Patient reports no chest pain, decreased concentration, depressed mood, excessive worry, palpitations or panic. Symptoms occur occasionally. The severity of symptoms is moderate. The quality of sleep is good. Nighttime awakenings: occasional.     Compliance with medications is %.   Earache    There is pain in both ears. This is a new problem. The current episode started 1 to 4 weeks ago (1 week). The problem occurs every few minutes. The problem has been unchanged. There has been no fever. Associated symptoms include hearing loss (muffled). Pertinent negatives include no coughing, ear discharge or sore throat. Treatments tried: flonase NS. The treatment provided mild relief.        The following portions of the patient's history were reviewed and updated as appropriate: allergies, current medications, past family history, past medical history, past social history, past surgical history and problem list.    Review of Systems   Constitutional: Negative for fever.   HENT: Positive for ear pain and hearing loss (muffled). Negative for congestion, ear discharge, postnasal drip, sinus pressure and sore throat.    Respiratory: Negative for cough.    Cardiovascular: Negative for chest pain and palpitations.   Allergic/Immunologic: Positive for environmental allergies.   Neurological: Negative for light-headedness and headache.   Hematological: Negative.    Psychiatric/Behavioral: Negative for agitation, decreased concentration and depressed mood. The patient is nervous/anxious.        Objective   Physical Exam   Constitutional: She appears well-developed and well-nourished.   HENT:   Head: Normocephalic and atraumatic.   Right Ear: Hearing, external ear and ear canal normal.  Tympanic membrane is not injected and not erythematous. A middle ear effusion is present.   Left Ear: Hearing, external ear and ear canal normal. Tympanic membrane is not injected and not erythematous. A middle ear effusion is present.   Nose: Nose normal.   Eyes: Conjunctivae are normal.   Neck: Neck supple.   Cardiovascular: Normal rate, regular rhythm and normal heart sounds.   No murmur heard.  Pulmonary/Chest: Effort normal and breath sounds normal.   Musculoskeletal: She exhibits no deformity.   Lymphadenopathy:     She has no cervical adenopathy.   Neurological: She is alert.   Skin: Skin is warm and dry.   Psychiatric: She has a normal mood and affect. Her behavior is normal. Thought content normal.   Nursing note and vitals reviewed.        Assessment/Plan   Holli was seen today for 6mo f/u buspar and bilateral ear pain.    Diagnoses and all orders for this visit:    ETD (Eustachian tube dysfunction), bilateral  -     Chlorcyclizine-Pseudoephed (Stahist AD) 25-60 MG tablet; Take 1/2 to 1 tab po q 8 hrs prn congestion    Anxiety  -     busPIRone (BUSPAR) 7.5 MG tablet; Take 1 tab po 2 to 3 times per day      No sign of bacterial infection. Continue Flonase NS and Stahist AD added to treatment.   Anxiety is doing well with buspirone.

## 2020-03-25 NOTE — PATIENT INSTRUCTIONS
Go to the nearest ER or return to clinic if symptoms worsen, fever/chill develop    Eustachian Tube Dysfunction    Eustachian tube dysfunction refers to a condition in which a blockage develops in the narrow passage that connects the middle ear to the back of the nose (eustachian tube). The eustachian tube regulates air pressure in the middle ear by letting air move between the ear and nose. It also helps to drain fluid from the middle ear space.  Eustachian tube dysfunction can affect one or both ears. When the eustachian tube does not function properly, air pressure, fluid, or both can build up in the middle ear.  What are the causes?  This condition occurs when the eustachian tube becomes blocked or cannot open normally. Common causes of this condition include:  · Ear infections.  · Colds and other infections that affect the nose, mouth, and throat (upper respiratory tract).  · Allergies.  · Irritation from cigarette smoke.  · Irritation from stomach acid coming up into the esophagus (gastroesophageal reflux). The esophagus is the tube that carries food from the mouth to the stomach.  · Sudden changes in air pressure, such as from descending in an airplane or scuba diving.  · Abnormal growths in the nose or throat, such as:  ? Growths that line the nose (nasal polyps).  ? Abnormal growth of cells (tumors).  ? Enlarged tissue at the back of the throat (adenoids).  What increases the risk?  You are more likely to develop this condition if:  · You smoke.  · You are overweight.  · You are a child who has:  ? Certain birth defects of the mouth, such as cleft palate.  ? Large tonsils or adenoids.  What are the signs or symptoms?  Common symptoms of this condition include:  · A feeling of fullness in the ear.  · Ear pain.  · Clicking or popping noises in the ear.  · Ringing in the ear.  · Hearing loss.  · Loss of balance.  · Dizziness.  Symptoms may get worse when the air pressure around you changes, such as when you  "travel to an area of high elevation, fly on an airplane, or go scuba diving.  How is this diagnosed?  This condition may be diagnosed based on:  · Your symptoms.  · A physical exam of your ears, nose, and throat.  · Tests, such as those that measure:  ? The movement of your eardrum (tympanogram).  ? Your hearing (audiometry).  How is this treated?  Treatment depends on the cause and severity of your condition.  · In mild cases, you may relieve your symptoms by moving air into your ears. This is called \"popping the ears.\"  · In more severe cases, or if you have symptoms of fluid in your ears, treatment may include:  ? Medicines to relieve congestion (decongestants).  ? Medicines that treat allergies (antihistamines).  ? Nasal sprays or ear drops that contain medicines that reduce swelling (steroids).  ? A procedure to drain the fluid in your eardrum (myringotomy). In this procedure, a small tube is placed in the eardrum to:  § Drain the fluid.  § Restore the air in the middle ear space.  ? A procedure to insert a balloon device through the nose to inflate the opening of the eustachian tube (balloon dilation).  Follow these instructions at home:  Lifestyle  · Do not do any of the following until your health care provider approves:  ? Travel to high altitudes.  ? Fly in airplanes.  ? Work in a pressurized cabin or room.  ? Scuba dive.  · Do not use any products that contain nicotine or tobacco, such as cigarettes and e-cigarettes. If you need help quitting, ask your health care provider.  · Keep your ears dry. Wear fitted earplugs during showering and bathing. Dry your ears completely after.  General instructions  · Take over-the-counter and prescription medicines only as told by your health care provider.  · Use techniques to help pop your ears as recommended by your health care provider. These may include:  ? Chewing gum.  ? Yawning.  ? Frequent, forceful swallowing.  ? Closing your mouth, holding your nose closed, " and gently blowing as if you are trying to blow air out of your nose.  · Keep all follow-up visits as told by your health care provider. This is important.  Contact a health care provider if:  · Your symptoms do not go away after treatment.  · Your symptoms come back after treatment.  · You are unable to pop your ears.  · You have:  ? A fever.  ? Pain in your ear.  ? Pain in your head or neck.  ? Fluid draining from your ear.  · Your hearing suddenly changes.  · You become very dizzy.  · You lose your balance.  Summary  · Eustachian tube dysfunction refers to a condition in which a blockage develops in the eustachian tube.  · It can be caused by ear infections, allergies, inhaled irritants, or abnormal growths in the nose or throat.  · Symptoms include ear pain, hearing loss, or ringing in the ears.  · Mild cases are treated with maneuvers to unblock the ears, such as yawning or ear popping.  · Severe cases are treated with medicines. Surgery may also be done (rare).  This information is not intended to replace advice given to you by your health care provider. Make sure you discuss any questions you have with your health care provider.  Document Released: 01/13/2017 Document Revised: 04/09/2019 Document Reviewed: 04/09/2019  [employer] Patient Education © 2020 [employer]

## 2020-03-27 ENCOUNTER — OFFICE VISIT (OUTPATIENT)
Dept: OBSTETRICS AND GYNECOLOGY | Facility: CLINIC | Age: 25
End: 2020-03-27

## 2020-03-27 VITALS
HEIGHT: 62 IN | SYSTOLIC BLOOD PRESSURE: 110 MMHG | BODY MASS INDEX: 22.86 KG/M2 | DIASTOLIC BLOOD PRESSURE: 80 MMHG | WEIGHT: 124.2 LBS

## 2020-03-27 DIAGNOSIS — Z30.433 ENCOUNTER FOR REMOVAL AND REINSERTION OF INTRAUTERINE CONTRACEPTIVE DEVICE (IUD): ICD-10-CM

## 2020-03-27 PROCEDURE — 58300 INSERT INTRAUTERINE DEVICE: CPT | Performed by: OBSTETRICS & GYNECOLOGY

## 2020-03-27 PROCEDURE — 58301 REMOVE INTRAUTERINE DEVICE: CPT | Performed by: OBSTETRICS & GYNECOLOGY

## 2020-03-27 NOTE — PROGRESS NOTES
PROCEDURE NOTES    I identified the patient and obtained her informed consent for the procedure.    PROCEDURE  REMOVAL OF IUD    We discussed options for contraception and Holli Camacho choose to have her IUD removed.    On bimanual examination uterus feels slightly anteverted.  No adnexal masses appreciated.  Speculum was placed and cervix identified.  The IUD string was located.  With a Ring Forcep the Kathy  IUD  was removed, she tolerated it well.   The procedure was completed without any complications.    PROCEDURE    IUD INSERTION    This patient was counseled on the benefits and risks of insertion.  She has chosen  the Kyleena.    No LMP recorded. Patient has had an implant.   UCG  not done    After re-gloving with sterile gloves, the uterus sounded to 6.75   The  The Kyleena was advanced to a point 2 cms from the fundus and the arms were released from the sheath. The device was advanced to the fundus and the IUD was released fully from the sheath.  .  The string was trimmed to 3-3.5 cm.    The procedure was well tolerated. Holli Camacho was given 's pamphlet and instructions on pelvic rest for 72 hours; OTC tylenol/ NSAIDS and heating pad prn.   She will call for fever, pain , abnormal discharge or heavy bleeding.   Follow up in 6 weeks or sooner as needed.    Michel Triplett MD

## 2020-04-29 ENCOUNTER — E-VISIT (OUTPATIENT)
Dept: FAMILY MEDICINE CLINIC | Facility: CLINIC | Age: 25
End: 2020-04-29

## 2020-04-29 DIAGNOSIS — N30.00 ACUTE CYSTITIS WITHOUT HEMATURIA: Primary | ICD-10-CM

## 2020-04-29 PROCEDURE — 99421 OL DIG E/M SVC 5-10 MIN: CPT | Performed by: FAMILY MEDICINE

## 2020-04-29 RX ORDER — SULFAMETHOXAZOLE AND TRIMETHOPRIM 800; 160 MG/1; MG/1
1 TABLET ORAL 2 TIMES DAILY
Qty: 14 TABLET | Refills: 0 | Status: SHIPPED | OUTPATIENT
Start: 2020-04-29 | End: 2020-05-06

## 2020-04-29 NOTE — PATIENT INSTRUCTIONS

## 2020-04-29 NOTE — PROGRESS NOTES
I have reviewed the patient entered information above. Based on this review my recommendations, orders and follow up are noted below.       Time spent during encounter: 10 minutes

## 2020-05-06 RX ORDER — ELAGOLIX 150 MG/1
TABLET, FILM COATED ORAL
Qty: 28 TABLET | Refills: 0 | Status: SHIPPED | OUTPATIENT
Start: 2020-05-06

## 2022-01-11 ENCOUNTER — TRANSCRIBE ORDERS (OUTPATIENT)
Dept: OBSTETRICS AND GYNECOLOGY | Facility: HOSPITAL | Age: 27
End: 2022-01-11

## 2022-01-11 DIAGNOSIS — O28.5 ABNORMAL GENETIC TEST DURING PREGNANCY: Primary | ICD-10-CM

## 2022-01-24 ENCOUNTER — OFFICE VISIT (OUTPATIENT)
Dept: OBSTETRICS AND GYNECOLOGY | Facility: HOSPITAL | Age: 27
End: 2022-01-24

## 2022-01-24 ENCOUNTER — HOSPITAL ENCOUNTER (OUTPATIENT)
Dept: WOMENS IMAGING | Facility: HOSPITAL | Age: 27
Discharge: HOME | End: 2022-01-24
Admitting: OBSTETRICS & GYNECOLOGY

## 2022-01-24 VITALS — DIASTOLIC BLOOD PRESSURE: 72 MMHG | WEIGHT: 135 LBS | BODY MASS INDEX: 25.09 KG/M2 | SYSTOLIC BLOOD PRESSURE: 127 MMHG

## 2022-01-24 DIAGNOSIS — O28.5 ABNORMAL GENETIC TEST DURING PREGNANCY: ICD-10-CM

## 2022-01-24 DIAGNOSIS — O36.5930 MATERNAL CARE FOR POOR FETAL GROWTH IN THIRD TRIMESTER, SINGLE OR UNSPECIFIED FETUS: ICD-10-CM

## 2022-01-24 DIAGNOSIS — R77.2 ELEVATED AFP: Primary | ICD-10-CM

## 2022-01-24 PROCEDURE — 76811 OB US DETAILED SNGL FETUS: CPT | Performed by: OBSTETRICS & GYNECOLOGY

## 2022-01-24 PROCEDURE — 76811 OB US DETAILED SNGL FETUS: CPT

## 2022-01-24 PROCEDURE — 99214 OFFICE O/P EST MOD 30 MIN: CPT | Performed by: OBSTETRICS & GYNECOLOGY

## 2022-01-24 RX ORDER — PRENATAL WITH FERROUS FUM AND FOLIC ACID 3080; 920; 120; 400; 22; 1.84; 3; 20; 10; 1; 12; 200; 27; 25; 2 [IU]/1; [IU]/1; MG/1; [IU]/1; MG/1; MG/1; MG/1; MG/1; MG/1; MG/1; UG/1; MG/1; MG/1; MG/1; MG/1
1 TABLET ORAL DAILY
COMMUNITY

## 2022-02-21 ENCOUNTER — APPOINTMENT (OUTPATIENT)
Dept: WOMENS IMAGING | Facility: HOSPITAL | Age: 27
End: 2022-02-21